# Patient Record
Sex: FEMALE | Race: WHITE | NOT HISPANIC OR LATINO | Employment: OTHER | URBAN - METROPOLITAN AREA
[De-identification: names, ages, dates, MRNs, and addresses within clinical notes are randomized per-mention and may not be internally consistent; named-entity substitution may affect disease eponyms.]

---

## 2023-05-17 PROBLEM — I10 ESSENTIAL HYPERTENSION: Status: ACTIVE | Noted: 2023-05-17

## 2023-05-17 PROBLEM — E78.5 BORDERLINE HYPERLIPIDEMIA: Status: ACTIVE | Noted: 2023-05-17

## 2023-05-17 PROBLEM — M17.9 KNEE OSTEOARTHRITIS: Status: ACTIVE | Noted: 2023-05-17

## 2023-05-17 PROBLEM — E03.9 HYPOTHYROIDISM: Status: ACTIVE | Noted: 2023-05-17

## 2023-05-17 PROBLEM — L25.9 CONTACT DERMATITIS: Status: ACTIVE | Noted: 2023-05-17

## 2023-05-17 RX ORDER — LISINOPRIL 10 MG/1
1 TABLET ORAL DAILY
COMMUNITY
Start: 2012-11-15 | End: 2023-08-02 | Stop reason: SDUPTHER

## 2023-05-17 RX ORDER — LEVOTHYROXINE SODIUM 100 UG/1
1 TABLET ORAL DAILY
COMMUNITY
Start: 2012-11-15 | End: 2024-04-23 | Stop reason: SDUPTHER

## 2023-05-18 ENCOUNTER — OFFICE VISIT (OUTPATIENT)
Dept: PRIMARY CARE | Facility: CLINIC | Age: 73
End: 2023-05-18
Payer: MEDICARE

## 2023-05-18 VITALS
DIASTOLIC BLOOD PRESSURE: 84 MMHG | HEIGHT: 65 IN | RESPIRATION RATE: 16 BRPM | HEART RATE: 86 BPM | SYSTOLIC BLOOD PRESSURE: 146 MMHG | TEMPERATURE: 96.7 F | OXYGEN SATURATION: 97 % | BODY MASS INDEX: 21.3 KG/M2

## 2023-05-18 DIAGNOSIS — M75.81 RIGHT ROTATOR CUFF TENDONITIS: ICD-10-CM

## 2023-05-18 DIAGNOSIS — Z12.31 ENCOUNTER FOR SCREENING MAMMOGRAM FOR BREAST CANCER: ICD-10-CM

## 2023-05-18 DIAGNOSIS — Z00.00 ROUTINE GENERAL MEDICAL EXAMINATION AT HEALTH CARE FACILITY: Primary | ICD-10-CM

## 2023-05-18 DIAGNOSIS — E78.5 BORDERLINE HYPERLIPIDEMIA: ICD-10-CM

## 2023-05-18 DIAGNOSIS — I10 ESSENTIAL HYPERTENSION: ICD-10-CM

## 2023-05-18 PROCEDURE — 99397 PER PM REEVAL EST PAT 65+ YR: CPT | Performed by: INTERNAL MEDICINE

## 2023-05-18 PROCEDURE — 93000 ELECTROCARDIOGRAM COMPLETE: CPT | Performed by: INTERNAL MEDICINE

## 2023-05-18 PROCEDURE — G0444 DEPRESSION SCREEN ANNUAL: HCPCS | Performed by: INTERNAL MEDICINE

## 2023-05-18 PROCEDURE — G0439 PPPS, SUBSEQ VISIT: HCPCS | Performed by: INTERNAL MEDICINE

## 2023-05-18 PROCEDURE — 3077F SYST BP >= 140 MM HG: CPT | Performed by: INTERNAL MEDICINE

## 2023-05-18 PROCEDURE — 3079F DIAST BP 80-89 MM HG: CPT | Performed by: INTERNAL MEDICINE

## 2023-05-18 PROCEDURE — 1159F MED LIST DOCD IN RCRD: CPT | Performed by: INTERNAL MEDICINE

## 2023-05-18 PROCEDURE — 1036F TOBACCO NON-USER: CPT | Performed by: INTERNAL MEDICINE

## 2023-05-18 PROCEDURE — 1170F FXNL STATUS ASSESSED: CPT | Performed by: INTERNAL MEDICINE

## 2023-05-18 RX ORDER — MECLIZINE HYDROCHLORIDE 25 MG/1
25 TABLET ORAL 3 TIMES DAILY
COMMUNITY
Start: 2022-07-30

## 2023-05-18 ASSESSMENT — ACTIVITIES OF DAILY LIVING (ADL)
MANAGING_FINANCES: INDEPENDENT
DRESSING: INDEPENDENT
TAKING_MEDICATION: INDEPENDENT
DOING_HOUSEWORK: INDEPENDENT
BATHING: INDEPENDENT
GROCERY_SHOPPING: INDEPENDENT

## 2023-05-18 ASSESSMENT — PATIENT HEALTH QUESTIONNAIRE - PHQ9
SUM OF ALL RESPONSES TO PHQ9 QUESTIONS 1 AND 2: 0
1. LITTLE INTEREST OR PLEASURE IN DOING THINGS: NOT AT ALL
2. FEELING DOWN, DEPRESSED OR HOPELESS: NOT AT ALL

## 2023-05-19 LAB
ALANINE AMINOTRANSFERASE (SGPT) (U/L) IN SER/PLAS: 12 U/L (ref 7–45)
ALBUMIN (G/DL) IN SER/PLAS: 4.3 G/DL (ref 3.4–5)
ALKALINE PHOSPHATASE (U/L) IN SER/PLAS: 90 U/L (ref 33–136)
ANION GAP IN SER/PLAS: 12 MMOL/L (ref 10–20)
ASPARTATE AMINOTRANSFERASE (SGOT) (U/L) IN SER/PLAS: 18 U/L (ref 9–39)
BILIRUBIN TOTAL (MG/DL) IN SER/PLAS: 0.4 MG/DL (ref 0–1.2)
CALCIUM (MG/DL) IN SER/PLAS: 9.8 MG/DL (ref 8.6–10.3)
CARBON DIOXIDE, TOTAL (MMOL/L) IN SER/PLAS: 26 MMOL/L (ref 21–32)
CHLORIDE (MMOL/L) IN SER/PLAS: 105 MMOL/L (ref 98–107)
CHOLESTEROL (MG/DL) IN SER/PLAS: 205 MG/DL (ref 0–199)
CHOLESTEROL IN HDL (MG/DL) IN SER/PLAS: 69.2 MG/DL
CHOLESTEROL/HDL RATIO: 3
CREATININE (MG/DL) IN SER/PLAS: 0.69 MG/DL (ref 0.5–1.05)
ERYTHROCYTE DISTRIBUTION WIDTH (RATIO) BY AUTOMATED COUNT: 14 % (ref 11.5–14.5)
ERYTHROCYTE MEAN CORPUSCULAR HEMOGLOBIN CONCENTRATION (G/DL) BY AUTOMATED: 33 G/DL (ref 32–36)
ERYTHROCYTE MEAN CORPUSCULAR VOLUME (FL) BY AUTOMATED COUNT: 94 FL (ref 80–100)
ERYTHROCYTES (10*6/UL) IN BLOOD BY AUTOMATED COUNT: 4.59 X10E12/L (ref 4–5.2)
GFR FEMALE: >90 ML/MIN/1.73M2
GLUCOSE (MG/DL) IN SER/PLAS: 86 MG/DL (ref 74–99)
HEMATOCRIT (%) IN BLOOD BY AUTOMATED COUNT: 43.3 % (ref 36–46)
HEMOGLOBIN (G/DL) IN BLOOD: 14.3 G/DL (ref 12–16)
LDL: 107 MG/DL (ref 0–99)
LEUKOCYTES (10*3/UL) IN BLOOD BY AUTOMATED COUNT: 5.7 X10E9/L (ref 4.4–11.3)
PLATELETS (10*3/UL) IN BLOOD AUTOMATED COUNT: 315 X10E9/L (ref 150–450)
POTASSIUM (MMOL/L) IN SER/PLAS: 4.6 MMOL/L (ref 3.5–5.3)
PROTEIN TOTAL: 7.5 G/DL (ref 6.4–8.2)
SODIUM (MMOL/L) IN SER/PLAS: 138 MMOL/L (ref 136–145)
THYROTROPIN (MIU/L) IN SER/PLAS BY DETECTION LIMIT <= 0.05 MIU/L: 0.69 MIU/L (ref 0.44–3.98)
TRIGLYCERIDE (MG/DL) IN SER/PLAS: 142 MG/DL (ref 0–149)
UREA NITROGEN (MG/DL) IN SER/PLAS: 18 MG/DL (ref 6–23)
VLDL: 28 MG/DL (ref 0–40)

## 2023-05-19 ASSESSMENT — ENCOUNTER SYMPTOMS
DIFFICULTY URINATING: 0
UNEXPECTED WEIGHT CHANGE: 0
ACTIVITY CHANGE: 0
LIGHT-HEADEDNESS: 0
HEMATURIA: 0
VOMITING: 0
VOICE CHANGE: 0
NAUSEA: 0
CONSTIPATION: 0
PALPITATIONS: 0
TREMORS: 0
TROUBLE SWALLOWING: 0
DIARRHEA: 0
WHEEZING: 0
ARTHRALGIAS: 0
FATIGUE: 0
DIZZINESS: 0
BLOOD IN STOOL: 0
APPETITE CHANGE: 0
HEADACHES: 0
COUGH: 0
CHEST TIGHTNESS: 0

## 2023-05-19 NOTE — PROGRESS NOTES
"Subjective   Reason for Visit: Evelyne Coley is an 72 y.o. female here for a Medicare Wellness visit.     Past Medical, Surgical, and Family History reviewed and updated in chart.         74 yo female here for a AMWV  Mother passed away in March after a prolonged hospital/rehab stay    Ongoing right shoulder pain - now has time for PT        Patient Care Team:  Candice Johnston MD as PCP - General  Candice Johnston MD as PCP - [employer] Medicare Advantage PCP  Lucy Schwartz MD as Referring Physician (Rheumatology)  Theresa John MD as Referring Physician (Ophthalmology)  Mansoor Bradley MD as Referring Physician (Sports Medicine)     Review of Systems   Constitutional:  Negative for activity change, appetite change, fatigue and unexpected weight change.   HENT:  Negative for ear pain, hearing loss, tinnitus, trouble swallowing and voice change.    Eyes:  Negative for visual disturbance.   Respiratory:  Negative for cough, chest tightness and wheezing.    Cardiovascular:  Negative for chest pain, palpitations and leg swelling.   Gastrointestinal:  Negative for blood in stool, constipation, diarrhea, nausea and vomiting.   Genitourinary:  Negative for difficulty urinating, hematuria and vaginal bleeding.   Musculoskeletal:  Negative for arthralgias.   Skin:  Negative for rash.   Neurological:  Negative for dizziness, tremors, syncope, light-headedness and headaches.       Objective   Vitals:  /84   Pulse 86   Temp 35.9 °C (96.7 °F)   Resp 16   Ht 1.651 m (5' 5\")   SpO2 97%   BMI 21.30 kg/m²       Physical Exam  Constitutional:       General: She is not in acute distress.     Appearance: She is well-developed. She is not diaphoretic.   HENT:      Head: Normocephalic.      Right Ear: Tympanic membrane normal. There is no impacted cerumen.      Left Ear: Tympanic membrane normal. There is no impacted cerumen.      Nose: Nose normal.      Mouth/Throat:      Mouth: Mucous membranes are moist.    "   Pharynx: Oropharynx is clear. No oropharyngeal exudate or posterior oropharyngeal erythema.   Eyes:      General: No scleral icterus.     Extraocular Movements: Extraocular movements intact.      Conjunctiva/sclera: Conjunctivae normal.      Pupils: Pupils are equal, round, and reactive to light.   Neck:      Thyroid: No thyromegaly.      Vascular: No JVD.   Cardiovascular:      Rate and Rhythm: Normal rate and regular rhythm.      Pulses: Normal pulses.      Heart sounds: Normal heart sounds. No murmur heard.     No friction rub. No gallop.   Pulmonary:      Effort: Pulmonary effort is normal. No respiratory distress.      Breath sounds: Normal breath sounds. No wheezing or rales.   Chest:      Chest wall: No tenderness.   Breasts:     Right: Normal.      Left: Normal.   Abdominal:      General: Bowel sounds are normal. There is no distension.      Palpations: Abdomen is soft. There is no mass.      Tenderness: There is no abdominal tenderness. There is no rebound.   Musculoskeletal:         General: Normal range of motion.      Cervical back: Normal range of motion and neck supple.   Lymphadenopathy:      Cervical: No cervical adenopathy.   Skin:     General: Skin is warm and dry.   Neurological:      General: No focal deficit present.      Mental Status: She is alert and oriented to person, place, and time.      Deep Tendon Reflexes: Reflexes normal.   Psychiatric:         Mood and Affect: Mood normal.         Thought Content: Thought content normal.         Assessment/Plan   Problem List Items Addressed This Visit       Borderline hyperlipidemia    Relevant Orders    ECG 12 lead (Completed)    Essential hypertension    Current Assessment & Plan     A little high   Will check her BP tiwce a week for 4 weeks and send in the readings            Other Visit Diagnoses       Routine general medical examination at health care facility    -  Primary    Right rotator cuff tendonitis        Relevant Orders    Referral  to Physical Therapy    Encounter for screening mammogram for breast cancer        Relevant Orders    BI mammo bilateral screening tomosynthesis

## 2023-06-30 LAB — CALCIDIOL (25 OH VITAMIN D3) (NG/ML) IN SER/PLAS: 17 NG/ML

## 2023-08-02 DIAGNOSIS — I10 ESSENTIAL HYPERTENSION: ICD-10-CM

## 2023-08-02 RX ORDER — LISINOPRIL 10 MG/1
10 TABLET ORAL DAILY
Qty: 90 TABLET | Refills: 1 | Status: SHIPPED | OUTPATIENT
Start: 2023-08-02 | End: 2024-01-26 | Stop reason: SDUPTHER

## 2023-10-09 ENCOUNTER — CLINICAL SUPPORT (OUTPATIENT)
Dept: PRIMARY CARE | Facility: CLINIC | Age: 73
End: 2023-10-09
Payer: MEDICARE

## 2023-10-09 DIAGNOSIS — Z23 ENCOUNTER FOR IMMUNIZATION: Primary | ICD-10-CM

## 2023-10-18 PROCEDURE — G0008 ADMIN INFLUENZA VIRUS VAC: HCPCS | Performed by: INTERNAL MEDICINE

## 2023-10-18 PROCEDURE — 90662 IIV NO PRSV INCREASED AG IM: CPT | Performed by: INTERNAL MEDICINE

## 2023-11-20 ENCOUNTER — OFFICE VISIT (OUTPATIENT)
Dept: PRIMARY CARE | Facility: CLINIC | Age: 73
End: 2023-11-20
Payer: MEDICARE

## 2023-11-20 ENCOUNTER — ANCILLARY PROCEDURE (OUTPATIENT)
Dept: RADIOLOGY | Facility: CLINIC | Age: 73
End: 2023-11-20
Payer: MEDICARE

## 2023-11-20 VITALS
TEMPERATURE: 96.1 F | DIASTOLIC BLOOD PRESSURE: 84 MMHG | HEIGHT: 65 IN | HEART RATE: 90 BPM | SYSTOLIC BLOOD PRESSURE: 134 MMHG | OXYGEN SATURATION: 96 % | BODY MASS INDEX: 25.83 KG/M2 | WEIGHT: 155 LBS

## 2023-11-20 DIAGNOSIS — M25.532 PAIN IN BOTH WRISTS: Primary | ICD-10-CM

## 2023-11-20 DIAGNOSIS — M25.532 PAIN IN BOTH WRISTS: ICD-10-CM

## 2023-11-20 DIAGNOSIS — E03.9 HYPOTHYROIDISM, UNSPECIFIED TYPE: ICD-10-CM

## 2023-11-20 DIAGNOSIS — M25.531 PAIN IN BOTH WRISTS: ICD-10-CM

## 2023-11-20 DIAGNOSIS — I10 ESSENTIAL HYPERTENSION: ICD-10-CM

## 2023-11-20 DIAGNOSIS — M25.531 PAIN IN BOTH WRISTS: Primary | ICD-10-CM

## 2023-11-20 PROCEDURE — 99214 OFFICE O/P EST MOD 30 MIN: CPT | Performed by: INTERNAL MEDICINE

## 2023-11-20 PROCEDURE — 3079F DIAST BP 80-89 MM HG: CPT | Performed by: INTERNAL MEDICINE

## 2023-11-20 PROCEDURE — 1036F TOBACCO NON-USER: CPT | Performed by: INTERNAL MEDICINE

## 2023-11-20 PROCEDURE — 73110 X-RAY EXAM OF WRIST: CPT | Mod: BILATERAL PROCEDURE | Performed by: RADIOLOGY

## 2023-11-20 PROCEDURE — 73110 X-RAY EXAM OF WRIST: CPT | Mod: 50

## 2023-11-20 PROCEDURE — 1159F MED LIST DOCD IN RCRD: CPT | Performed by: INTERNAL MEDICINE

## 2023-11-20 PROCEDURE — 3075F SYST BP GE 130 - 139MM HG: CPT | Performed by: INTERNAL MEDICINE

## 2023-11-20 RX ORDER — MELOXICAM 15 MG/1
15 TABLET ORAL DAILY
Qty: 30 TABLET | Refills: 11 | Status: SHIPPED | OUTPATIENT
Start: 2023-11-20 | End: 2024-11-19

## 2023-11-20 NOTE — PROGRESS NOTES
"Subjective   Patient ID: Evelyne Coley is a 72 y.o. female who presents for 6 month follow up and Hypertension.    HPI   Patient presents today following up with HTN. Patient is currently taking Lisinopril with out any complications. Patient does not check her blood pressure at home on a regular basis. Patient denies SOB, headaches, dizziness and chest pains.     C/o hands hurting - with activity  No painovernight  No pain in am  No stiffness  Hurts to grasp  Benggay does not help  Has tried tylenol and Aleve - the aleve helps      Review of Systems    Objective   /84 (BP Location: Right arm, Patient Position: Sitting)   Pulse 90   Temp 35.6 °C (96.1 °F)   Ht 1.651 m (5' 5\")   Wt 70.3 kg (155 lb)   SpO2 96%   BMI 25.79 kg/m²     Physical Exam  Vitals and nursing note reviewed.   Cardiovascular:      Rate and Rhythm: Normal rate and regular rhythm.   Pulmonary:      Effort: Pulmonary effort is normal.      Breath sounds: Normal breath sounds.         Assessment/Plan   Problem List Items Addressed This Visit             ICD-10-CM    Essential hypertension I10     Stable         Hypothyroidism E03.9     Other Visit Diagnoses         Codes    Pain in both wrists    -  Primary M25.531, M25.532    Meloxicam for 30 days - if no better to ortho     Relevant Medications    meloxicam (Mobic) 15 mg tablet    Other Relevant Orders    XR wrist 3+ views bilateral          Follow up with me in 6 months     "

## 2024-01-26 DIAGNOSIS — I10 ESSENTIAL HYPERTENSION: ICD-10-CM

## 2024-01-26 RX ORDER — LISINOPRIL 10 MG/1
10 TABLET ORAL DAILY
Qty: 90 TABLET | Refills: 1 | Status: SHIPPED | OUTPATIENT
Start: 2024-01-26 | End: 2024-05-03 | Stop reason: SDUPTHER

## 2024-04-23 DIAGNOSIS — E03.9 HYPOTHYROIDISM, UNSPECIFIED TYPE: Primary | ICD-10-CM

## 2024-04-23 DIAGNOSIS — E03.9 HYPOTHYROIDISM, UNSPECIFIED TYPE: ICD-10-CM

## 2024-04-23 RX ORDER — LEVOTHYROXINE SODIUM 100 UG/1
100 TABLET ORAL DAILY
Qty: 90 TABLET | Refills: 0 | Status: SHIPPED | OUTPATIENT
Start: 2024-04-23 | End: 2024-04-23 | Stop reason: SDUPTHER

## 2024-04-23 RX ORDER — LEVOTHYROXINE SODIUM 100 UG/1
100 TABLET ORAL DAILY
Qty: 90 TABLET | Refills: 0 | Status: SHIPPED | OUTPATIENT
Start: 2024-04-23

## 2024-04-23 NOTE — TELEPHONE ENCOUNTER
Pt called stated she called for refill of rx yesterday. Nothing has been called in.  Pt only has 2 pills of  levothyroxine.  Please send rx to local [facility] in [place] (pt not using mail in express scripts anymore)    Please call pt at  [phone]

## 2024-05-03 DIAGNOSIS — I10 ESSENTIAL HYPERTENSION: ICD-10-CM

## 2024-05-03 RX ORDER — LISINOPRIL 10 MG/1
10 TABLET ORAL DAILY
Qty: 90 TABLET | Refills: 1 | Status: SHIPPED | OUTPATIENT
Start: 2024-05-03 | End: 2024-05-23

## 2024-05-23 ENCOUNTER — OFFICE VISIT (OUTPATIENT)
Dept: PRIMARY CARE | Facility: CLINIC | Age: 74
End: 2024-05-23
Payer: MEDICARE

## 2024-05-23 VITALS
HEART RATE: 93 BPM | BODY MASS INDEX: 26.33 KG/M2 | WEIGHT: 158 LBS | SYSTOLIC BLOOD PRESSURE: 158 MMHG | HEIGHT: 65 IN | DIASTOLIC BLOOD PRESSURE: 94 MMHG | OXYGEN SATURATION: 97 % | TEMPERATURE: 97.3 F

## 2024-05-23 DIAGNOSIS — Z00.00 ROUTINE GENERAL MEDICAL EXAMINATION AT A HEALTH CARE FACILITY: ICD-10-CM

## 2024-05-23 DIAGNOSIS — Z11.59 NEED FOR HEPATITIS C SCREENING TEST: ICD-10-CM

## 2024-05-23 DIAGNOSIS — I10 ESSENTIAL HYPERTENSION: ICD-10-CM

## 2024-05-23 DIAGNOSIS — Z00.00 ROUTINE GENERAL MEDICAL EXAMINATION AT HEALTH CARE FACILITY: Primary | ICD-10-CM

## 2024-05-23 DIAGNOSIS — Z13.6 SCREENING FOR CARDIOVASCULAR CONDITION: ICD-10-CM

## 2024-05-23 DIAGNOSIS — Z12.31 ENCOUNTER FOR SCREENING MAMMOGRAM FOR BREAST CANCER: ICD-10-CM

## 2024-05-23 PROCEDURE — 1159F MED LIST DOCD IN RCRD: CPT | Performed by: INTERNAL MEDICINE

## 2024-05-23 PROCEDURE — 1157F ADVNC CARE PLAN IN RCRD: CPT | Performed by: INTERNAL MEDICINE

## 2024-05-23 PROCEDURE — 3080F DIAST BP >= 90 MM HG: CPT | Performed by: INTERNAL MEDICINE

## 2024-05-23 PROCEDURE — G0444 DEPRESSION SCREEN ANNUAL: HCPCS | Performed by: INTERNAL MEDICINE

## 2024-05-23 PROCEDURE — 3077F SYST BP >= 140 MM HG: CPT | Performed by: INTERNAL MEDICINE

## 2024-05-23 PROCEDURE — 1170F FXNL STATUS ASSESSED: CPT | Performed by: INTERNAL MEDICINE

## 2024-05-23 PROCEDURE — 93000 ELECTROCARDIOGRAM COMPLETE: CPT | Performed by: INTERNAL MEDICINE

## 2024-05-23 PROCEDURE — 99397 PER PM REEVAL EST PAT 65+ YR: CPT | Performed by: INTERNAL MEDICINE

## 2024-05-23 PROCEDURE — G0439 PPPS, SUBSEQ VISIT: HCPCS | Performed by: INTERNAL MEDICINE

## 2024-05-23 PROCEDURE — 1160F RVW MEDS BY RX/DR IN RCRD: CPT | Performed by: INTERNAL MEDICINE

## 2024-05-23 PROCEDURE — 1036F TOBACCO NON-USER: CPT | Performed by: INTERNAL MEDICINE

## 2024-05-23 RX ORDER — LISINOPRIL 20 MG/1
20 TABLET ORAL DAILY
Qty: 100 TABLET | Refills: 3 | Status: SHIPPED | OUTPATIENT
Start: 2024-05-23 | End: 2025-06-27

## 2024-05-23 ASSESSMENT — ENCOUNTER SYMPTOMS
NAUSEA: 0
APPETITE CHANGE: 0
VOMITING: 0
DIZZINESS: 0
DEPRESSION: 0
LOSS OF SENSATION IN FEET: 0
OCCASIONAL FEELINGS OF UNSTEADINESS: 0
HEMATURIA: 0
FATIGUE: 0
COUGH: 0
UNEXPECTED WEIGHT CHANGE: 0
DIARRHEA: 0
TROUBLE SWALLOWING: 0
VOICE CHANGE: 0
DIFFICULTY URINATING: 0
HEADACHES: 0
BLOOD IN STOOL: 0
WHEEZING: 0
ACTIVITY CHANGE: 0
CHEST TIGHTNESS: 0
PALPITATIONS: 0
LIGHT-HEADEDNESS: 0
TREMORS: 0
ARTHRALGIAS: 0
CONSTIPATION: 0

## 2024-05-23 ASSESSMENT — PATIENT HEALTH QUESTIONNAIRE - PHQ9
2. FEELING DOWN, DEPRESSED OR HOPELESS: NOT AT ALL
SUM OF ALL RESPONSES TO PHQ9 QUESTIONS 1 AND 2: 0
1. LITTLE INTEREST OR PLEASURE IN DOING THINGS: NOT AT ALL

## 2024-05-23 ASSESSMENT — ACTIVITIES OF DAILY LIVING (ADL)
MANAGING_FINANCES: INDEPENDENT
BATHING: INDEPENDENT
DRESSING: INDEPENDENT
TAKING_MEDICATION: INDEPENDENT
GROCERY_SHOPPING: INDEPENDENT
DOING_HOUSEWORK: INDEPENDENT

## 2024-05-23 NOTE — PROGRESS NOTES
"Subjective   Reason for Visit: Evelyne Coley is an 73 y.o. female here for a Medicare Wellness visit.     Past Medical, Surgical, and Family History reviewed and updated in chart.    Reviewed all medications by prescribing practitioner or clinical pharmacist (such as prescriptions, OTCs, herbal therapies and supplements) and documented in the medical record.    73 y.o. here for a AMWV/physical  Has been feeling well  No active complaints today      Retired -  Children - 3  Grandchildren -    Tob - Nonsmoker  Alcohol - rare  Marijuana  - denies  Exercise -  rare          Patient Care Team:  Candice Johnston MD as PCP - General  Candice Johnston MD as PCP - tna Medicare Advantage PCP  Lucy Schwartz MD as Referring Physician (Rheumatology)  Theresa John MD as Referring Physician (Ophthalmology)  Mansoor Bradley MD as Referring Physician (Sports Medicine)     Review of Systems   Constitutional:  Negative for activity change, appetite change, fatigue and unexpected weight change.   HENT:  Negative for ear pain, hearing loss, tinnitus, trouble swallowing and voice change.    Eyes:  Negative for visual disturbance.   Respiratory:  Negative for cough, chest tightness and wheezing.    Cardiovascular:  Negative for chest pain, palpitations and leg swelling.   Gastrointestinal:  Negative for blood in stool, constipation, diarrhea, nausea and vomiting.   Genitourinary:  Negative for difficulty urinating, hematuria and vaginal bleeding.   Musculoskeletal:  Negative for arthralgias.   Skin:  Negative for rash.   Neurological:  Negative for dizziness, tremors, syncope, light-headedness and headaches.       Objective   Vitals:  BP (!) 158/94   Pulse 93   Temp 36.3 °C (97.3 °F)   Ht 1.651 m (5' 5\")   Wt 71.7 kg (158 lb)   SpO2 97%   BMI 26.29 kg/m²       Physical Exam  Constitutional:       General: She is not in acute distress.     Appearance: She is well-developed. She is not diaphoretic. "   HENT:      Head: Normocephalic.      Right Ear: Tympanic membrane normal. There is no impacted cerumen.      Left Ear: Tympanic membrane normal. There is no impacted cerumen.      Nose: Nose normal.      Mouth/Throat:      Mouth: Mucous membranes are moist.      Pharynx: Oropharynx is clear. No oropharyngeal exudate or posterior oropharyngeal erythema.   Eyes:      General: No scleral icterus.     Extraocular Movements: Extraocular movements intact.      Conjunctiva/sclera: Conjunctivae normal.      Pupils: Pupils are equal, round, and reactive to light.   Neck:      Thyroid: No thyromegaly.      Vascular: No JVD.   Cardiovascular:      Rate and Rhythm: Normal rate and regular rhythm.      Pulses: Normal pulses.      Heart sounds: Normal heart sounds. No murmur heard.     No friction rub. No gallop.   Pulmonary:      Effort: Pulmonary effort is normal. No respiratory distress.      Breath sounds: Normal breath sounds. No wheezing or rales.   Chest:      Chest wall: No tenderness.   Abdominal:      General: Bowel sounds are normal. There is no distension.      Palpations: Abdomen is soft. There is no mass.      Tenderness: There is no abdominal tenderness. There is no rebound.   Musculoskeletal:         General: Normal range of motion.      Cervical back: Normal range of motion and neck supple.   Lymphadenopathy:      Cervical: No cervical adenopathy.   Skin:     General: Skin is warm and dry.   Neurological:      General: No focal deficit present.      Mental Status: She is alert and oriented to person, place, and time.      Deep Tendon Reflexes: Reflexes normal.   Psychiatric:         Mood and Affect: Mood normal.         Thought Content: Thought content normal.         Assessment/Plan   Problem List Items Addressed This Visit       Essential hypertension    Relevant Medications    lisinopril 20 mg tablet    Other Relevant Orders    ECG 12 lead (Clinic Performed)    CBC     Other Visit Diagnoses       Routine  general medical examination at health care facility    -  Primary    Relevant Orders    1 Year Follow Up In [facility] - PCP - Wellness Exam    Routine general medical examination at a health care facility        Relevant Orders    CBC    Comprehensive Metabolic Panel    Lipid Panel    TSH with reflex to Free T4 if abnormal    Screening for cardiovascular condition        Relevant Orders    CT cardiac scoring wo IV contrast    Encounter for screening mammogram for breast cancer        Relevant Orders    BI mammo bilateral screening tomosynthesis    Need for hepatitis C screening test        Relevant Orders    Hepatitis C antibody          TdaP recommended  Depression screening completed via PHQ2 as documented (time spent 5 min)  Follow up with me in 4 months

## 2024-05-29 ENCOUNTER — LAB (OUTPATIENT)
Dept: LAB | Facility: LAB | Age: 74
End: 2024-05-29
Payer: MEDICARE

## 2024-05-29 DIAGNOSIS — Z00.00 ROUTINE GENERAL MEDICAL EXAMINATION AT A HEALTH CARE FACILITY: ICD-10-CM

## 2024-05-29 DIAGNOSIS — I10 ESSENTIAL HYPERTENSION: ICD-10-CM

## 2024-05-29 DIAGNOSIS — Z11.59 NEED FOR HEPATITIS C SCREENING TEST: ICD-10-CM

## 2024-05-29 LAB
ALBUMIN SERPL BCP-MCNC: 4.3 G/DL (ref 3.4–5)
ALP SERPL-CCNC: 87 U/L (ref 33–136)
ALT SERPL W P-5'-P-CCNC: 13 U/L (ref 7–45)
ANION GAP SERPL CALC-SCNC: 15 MMOL/L (ref 10–20)
AST SERPL W P-5'-P-CCNC: 14 U/L (ref 9–39)
BILIRUB SERPL-MCNC: 0.4 MG/DL (ref 0–1.2)
BUN SERPL-MCNC: 19 MG/DL (ref 6–23)
CALCIUM SERPL-MCNC: 9.6 MG/DL (ref 8.6–10.6)
CHLORIDE SERPL-SCNC: 105 MMOL/L (ref 98–107)
CHOLEST SERPL-MCNC: 222 MG/DL (ref 0–199)
CHOLESTEROL/HDL RATIO: 4.3
CO2 SERPL-SCNC: 23 MMOL/L (ref 21–32)
CREAT SERPL-MCNC: 0.81 MG/DL (ref 0.5–1.05)
EGFRCR SERPLBLD CKD-EPI 2021: 77 ML/MIN/1.73M*2
ERYTHROCYTE [DISTWIDTH] IN BLOOD BY AUTOMATED COUNT: 14.3 % (ref 11.5–14.5)
GLUCOSE SERPL-MCNC: 91 MG/DL (ref 74–99)
HCT VFR BLD AUTO: 43.6 % (ref 36–46)
HCV AB SER QL: NONREACTIVE
HDLC SERPL-MCNC: 51.7 MG/DL
HGB BLD-MCNC: 14.3 G/DL (ref 12–16)
LDLC SERPL CALC-MCNC: 132 MG/DL
MCH RBC QN AUTO: 30.6 PG (ref 26–34)
MCHC RBC AUTO-ENTMCNC: 32.8 G/DL (ref 32–36)
MCV RBC AUTO: 93 FL (ref 80–100)
NON HDL CHOLESTEROL: 170 MG/DL (ref 0–149)
NRBC BLD-RTO: 0 /100 WBCS (ref 0–0)
PLATELET # BLD AUTO: 319 X10*3/UL (ref 150–450)
POTASSIUM SERPL-SCNC: 4.4 MMOL/L (ref 3.5–5.3)
PROT SERPL-MCNC: 7.2 G/DL (ref 6.4–8.2)
RBC # BLD AUTO: 4.68 X10*6/UL (ref 4–5.2)
SODIUM SERPL-SCNC: 139 MMOL/L (ref 136–145)
TRIGL SERPL-MCNC: 191 MG/DL (ref 0–149)
TSH SERPL-ACNC: 0.79 MIU/L (ref 0.44–3.98)
VLDL: 38 MG/DL (ref 0–40)
WBC # BLD AUTO: 5.4 X10*3/UL (ref 4.4–11.3)

## 2024-05-29 PROCEDURE — 36415 COLL VENOUS BLD VENIPUNCTURE: CPT

## 2024-05-29 PROCEDURE — 80061 LIPID PANEL: CPT

## 2024-05-29 PROCEDURE — 84443 ASSAY THYROID STIM HORMONE: CPT

## 2024-05-29 PROCEDURE — 85027 COMPLETE CBC AUTOMATED: CPT

## 2024-05-29 PROCEDURE — 80053 COMPREHEN METABOLIC PANEL: CPT

## 2024-05-29 PROCEDURE — 86803 HEPATITIS C AB TEST: CPT

## 2024-06-18 ENCOUNTER — TELEPHONE (OUTPATIENT)
Dept: PRIMARY CARE | Facility: CLINIC | Age: 74
End: 2024-06-18
Payer: MEDICARE

## 2024-06-19 ENCOUNTER — OFFICE VISIT (OUTPATIENT)
Dept: PRIMARY CARE | Facility: CLINIC | Age: 74
End: 2024-06-19
Payer: MEDICARE

## 2024-06-19 ENCOUNTER — APPOINTMENT (OUTPATIENT)
Dept: RADIOLOGY | Facility: CLINIC | Age: 74
End: 2024-06-19
Payer: MEDICARE

## 2024-06-19 VITALS
WEIGHT: 158 LBS | HEART RATE: 98 BPM | SYSTOLIC BLOOD PRESSURE: 131 MMHG | OXYGEN SATURATION: 95 % | DIASTOLIC BLOOD PRESSURE: 83 MMHG | BODY MASS INDEX: 26.33 KG/M2 | HEIGHT: 65 IN | TEMPERATURE: 97 F

## 2024-06-19 DIAGNOSIS — J06.9 VIRAL UPPER RESPIRATORY TRACT INFECTION: Primary | ICD-10-CM

## 2024-06-19 PROCEDURE — 1157F ADVNC CARE PLAN IN RCRD: CPT | Performed by: INTERNAL MEDICINE

## 2024-06-19 PROCEDURE — 3075F SYST BP GE 130 - 139MM HG: CPT | Performed by: INTERNAL MEDICINE

## 2024-06-19 PROCEDURE — 99213 OFFICE O/P EST LOW 20 MIN: CPT | Performed by: INTERNAL MEDICINE

## 2024-06-19 PROCEDURE — 3079F DIAST BP 80-89 MM HG: CPT | Performed by: INTERNAL MEDICINE

## 2024-06-19 PROCEDURE — 1036F TOBACCO NON-USER: CPT | Performed by: INTERNAL MEDICINE

## 2024-06-19 PROCEDURE — 1159F MED LIST DOCD IN RCRD: CPT | Performed by: INTERNAL MEDICINE

## 2024-06-19 PROCEDURE — G2211 COMPLEX E/M VISIT ADD ON: HCPCS | Performed by: INTERNAL MEDICINE

## 2024-06-19 ASSESSMENT — ENCOUNTER SYMPTOMS
SINUS PRESSURE: 0
ACTIVITY CHANGE: 1
COUGH: 1
RHINORRHEA: 1
SINUS PAIN: 0

## 2024-06-19 NOTE — PROGRESS NOTES
Subjective   Patient ID: Evelyne Coley is a 73 y.o. female who presents for Cough and Follow-up (Neg Covid test. ).  Here with c/o 3 days of a URI  Started with a cough  Now has a runny nose  PND  No fevers/chills  No nausea  Some chest congestion but no nasal congestion    Takes Coricidin HBP with good improvement        Review of Systems   Constitutional:  Positive for activity change.   HENT:  Positive for postnasal drip and rhinorrhea. Negative for congestion, ear pain, sinus pressure and sinus pain.    Respiratory:  Positive for cough.        Objective   Vitals:    06/19/24 0855   BP: 131/83   Pulse: 98   Temp: 36.1 °C (97 °F)   SpO2: 95%     Physical Exam    Assessment/Plan   Problem List Items Addressed This Visit       Viral upper respiratory tract infection - Primary    Current Assessment & Plan     Rec supportive care  Call back in 1 week if no better

## 2024-07-05 ENCOUNTER — HOSPITAL ENCOUNTER (OUTPATIENT)
Dept: RADIOLOGY | Facility: CLINIC | Age: 74
Discharge: HOME | End: 2024-07-05
Payer: MEDICARE

## 2024-07-05 VITALS — BODY MASS INDEX: 25.33 KG/M2 | HEIGHT: 65 IN | WEIGHT: 152 LBS

## 2024-07-05 DIAGNOSIS — Z12.31 ENCOUNTER FOR SCREENING MAMMOGRAM FOR BREAST CANCER: ICD-10-CM

## 2024-07-05 DIAGNOSIS — Z13.6 SCREENING FOR CARDIOVASCULAR CONDITION: ICD-10-CM

## 2024-07-05 PROCEDURE — 75571 CT HRT W/O DYE W/CA TEST: CPT

## 2024-07-05 PROCEDURE — 77067 SCR MAMMO BI INCL CAD: CPT

## 2024-08-12 ENCOUNTER — LAB (OUTPATIENT)
Dept: LAB | Facility: LAB | Age: 74
End: 2024-08-12
Payer: MEDICARE

## 2024-08-12 ENCOUNTER — APPOINTMENT (OUTPATIENT)
Dept: RHEUMATOLOGY | Facility: CLINIC | Age: 74
End: 2024-08-12
Payer: MEDICARE

## 2024-08-12 VITALS
WEIGHT: 159.6 LBS | TEMPERATURE: 97.7 F | RESPIRATION RATE: 16 BRPM | SYSTOLIC BLOOD PRESSURE: 123 MMHG | HEIGHT: 65 IN | BODY MASS INDEX: 26.59 KG/M2 | HEART RATE: 101 BPM | OXYGEN SATURATION: 95 % | DIASTOLIC BLOOD PRESSURE: 82 MMHG

## 2024-08-12 DIAGNOSIS — M17.0 PRIMARY OSTEOARTHRITIS OF BOTH KNEES: ICD-10-CM

## 2024-08-12 DIAGNOSIS — M81.0 AGE-RELATED OSTEOPOROSIS WITHOUT CURRENT PATHOLOGICAL FRACTURE: ICD-10-CM

## 2024-08-12 DIAGNOSIS — M81.0 OSTEOPOROSIS, UNSPECIFIED OSTEOPOROSIS TYPE, UNSPECIFIED PATHOLOGICAL FRACTURE PRESENCE: ICD-10-CM

## 2024-08-12 DIAGNOSIS — M81.0 OSTEOPOROSIS, UNSPECIFIED OSTEOPOROSIS TYPE, UNSPECIFIED PATHOLOGICAL FRACTURE PRESENCE: Primary | ICD-10-CM

## 2024-08-12 LAB — 25(OH)D3 SERPL-MCNC: 27 NG/ML (ref 30–100)

## 2024-08-12 PROCEDURE — 3008F BODY MASS INDEX DOCD: CPT | Performed by: INTERNAL MEDICINE

## 2024-08-12 PROCEDURE — 1159F MED LIST DOCD IN RCRD: CPT | Performed by: INTERNAL MEDICINE

## 2024-08-12 PROCEDURE — 36415 COLL VENOUS BLD VENIPUNCTURE: CPT

## 2024-08-12 PROCEDURE — 3079F DIAST BP 80-89 MM HG: CPT | Performed by: INTERNAL MEDICINE

## 2024-08-12 PROCEDURE — 1036F TOBACCO NON-USER: CPT | Performed by: INTERNAL MEDICINE

## 2024-08-12 PROCEDURE — 1160F RVW MEDS BY RX/DR IN RCRD: CPT | Performed by: INTERNAL MEDICINE

## 2024-08-12 PROCEDURE — 1157F ADVNC CARE PLAN IN RCRD: CPT | Performed by: INTERNAL MEDICINE

## 2024-08-12 PROCEDURE — 99214 OFFICE O/P EST MOD 30 MIN: CPT | Performed by: INTERNAL MEDICINE

## 2024-08-12 PROCEDURE — 82306 VITAMIN D 25 HYDROXY: CPT

## 2024-08-12 PROCEDURE — 3074F SYST BP LT 130 MM HG: CPT | Performed by: INTERNAL MEDICINE

## 2024-08-12 ASSESSMENT — PATIENT HEALTH QUESTIONNAIRE - PHQ9: 1. LITTLE INTEREST OR PLEASURE IN DOING THINGS: NOT AT ALL

## 2024-08-12 NOTE — PROGRESS NOTES
Subjective   Patient ID: Evelyne Coley is a 73 y.o. female who presents for follow-up regarding osteoporosis.    HPI 73-year-old female here for follow-up regarding osteoporosis.    She was given a prescription for risedronate 150 mg orally once monthly August 2023 for treatment of osteoporosis.  However, she relates that she did not yet started.  She is taking Citracal 500 mg daily and vitamin D 1000 international units daily.  She was advised to see her dentist first and has not yet had a dental appointment.    She relates that she has been very busy with some health issues that her  has had over the last year.  Fortunately her  is doing better at this point, and she is planning on scheduling a dental visit.    She has no history of fractures as an adult.  There is no family history of osteoporosis or hip fracture.  Last menstrual period was in her mid 40s.  She took hormone replacement therapy for about 10 years.    She also notes recurrent bilateral knee pain.  She uses meloxicam 15 mg as needed, but does not take this daily.  She uses Tylenol occasionally.  Her knees were injected with Kenalog about 1 year ago with relief.    Labs June 2023: 25-hydroxy vitamin D 17  Labs May 2024: TSH 0.79, cholesterol 222, HDL 51, , triglycerides 191, CMP normal, CBC normal, hep C nonreactive    DEXA July 2023: T-score -2.7 left femoral neck, T-score -2.8 left total hip, T-score -1.0 lumbar spine    Knee x-rays done December 2021: Moderate joint space narrowing in the medial compartment of the right knee, mild joint space narrowing in the left knee.    Medical problem list:  Essential hypertension  Hypothyroidism  Osteoporosis    Medications: Reviewed as documented    Social history: .  Has 2 daughters.  She has 3 grandchildren.  She denies use of tobacco and alcohol.    Family history:  Mother-hypertension  Father-prostate cancer    Review of Systems  General: Denies fevers or chills.  CV:  "Denies chest pain or palpitations.  Denies leg edema.  Lungs: Denies coughing or shortness of breath.  Skin: Denies rashes or nodules.  MS: Complains of bilateral knee pain.    Objective   There were no vitals taken for this visit.  Visit Vitals  /82 (BP Location: Left arm, Patient Position: Sitting, BP Cuff Size: Adult)   Pulse 101   Temp 36.5 °C (97.7 °F) (Skin)   Resp 16   Ht 1.651 m (5' 5\")   Wt 72.4 kg (159 lb 9.6 oz)   SpO2 95%   BMI 26.56 kg/m²   OB Status Postmenopausal   Smoking Status Never   BSA 1.82 m²      Physical Exam  HEENT: PERRL, EOMI  Neck: Supple, no nodes.  CV: RRR, no MGR.  Lungs: Clear, no rales or wheezes.  Abdomen: Soft, nontender. No hepatosplenomegaly.  Extremities:  No cyanosis, clubbing, or edema.   MS: No synovitis.  Knees cool without effusions.  Moderate crepitus in right knee, mild crepitus in left knee.  Skin: No rashes or nodules.      Assessment/Plan   Problem List Items Addressed This Visit             ICD-10-CM    Knee osteoarthritis M17.9    Age-related osteoporosis without current pathological fracture M81.0    BMI 26.0-26.9,adult Z68.26     Other Visit Diagnoses         Codes    Osteoporosis, unspecified osteoporosis type, unspecified pathological fracture presence    -  Primary M81.0    Relevant Orders    Vitamin D 25-Hydroxy,Total (for eval of Vitamin D levels) (Completed)            Osteoporosis-she was given a prescription for risedronate 150 mg orally once monthly August 2023, but she has not yet started the prescription.  She was advised to get a dental exam first to make sure she does not need any major dental work.  Warned of risk of esophagitis, ONJ, and atypical hip fractures. Advised on proper dosing instructions.  I also recommend check 25-hydroxy vitamin D, as her vitamin D was slightly low at 17 in 2023.    OA both knees-I advised trying Tylenol 1000 mg 3 times daily as needed.  If this is ineffective, she will follow-up for Kenalog injections.    BMI " 26-stable.  She will work on weight reduction.    Plan:  Try tylenol 1000 mg 3x daily as needed for joint pain.  If knee pain does not improve, consider kenalog injection.  Please make dental appointment. Try calling [facility].  After dental exam,I recommend start risedronate 150 mg orally monthly.  Check 25-hydroxy Vitamin D.  Bone density will be repeated 7/25.  Follow up in 1 year.

## 2024-08-12 NOTE — PATIENT INSTRUCTIONS
Try tylenol 1000 mg 3x daily as needed for joint pain.  If knee pain does not improve, consider kenalog injection.  Please make dental appointment. Try calling [facility].  After dental exam,I recommend start risedronate 150 mg orally monthly.  Check 25-hydroxy Vitamin D.  Bone density will be repeated 7/25.  Follow up in 1 year.

## 2024-09-23 ENCOUNTER — APPOINTMENT (OUTPATIENT)
Dept: PRIMARY CARE | Facility: CLINIC | Age: 74
End: 2024-09-23
Payer: MEDICARE

## 2024-10-09 ENCOUNTER — CLINICAL SUPPORT (OUTPATIENT)
Dept: PRIMARY CARE | Facility: CLINIC | Age: 74
End: 2024-10-09
Payer: MEDICARE

## 2024-10-09 DIAGNOSIS — Z00.00 HEALTHCARE MAINTENANCE: ICD-10-CM

## 2024-10-09 PROCEDURE — G0008 ADMIN INFLUENZA VIRUS VAC: HCPCS | Performed by: INTERNAL MEDICINE

## 2024-10-09 PROCEDURE — 90662 IIV NO PRSV INCREASED AG IM: CPT | Performed by: INTERNAL MEDICINE

## 2024-10-25 DIAGNOSIS — E03.9 HYPOTHYROIDISM, UNSPECIFIED TYPE: ICD-10-CM

## 2024-10-25 RX ORDER — LEVOTHYROXINE SODIUM 100 UG/1
100 TABLET ORAL DAILY
Qty: 90 TABLET | Refills: 0 | Status: SHIPPED | OUTPATIENT
Start: 2024-10-25

## 2024-10-25 NOTE — TELEPHONE ENCOUNTER
Patient states pharmacy called ([facility] [address]), 4 days ago regarding a refill on Levothyroxine/100 mcg//patient is down to her last pill, please call back today, when prescription is called into pharmacy.

## 2024-10-25 NOTE — TELEPHONE ENCOUNTER
I spoke to the patient and advised, I sent the Rx request to Dr. Chauhan and will receive it today.

## 2024-12-23 ENCOUNTER — APPOINTMENT (OUTPATIENT)
Dept: PRIMARY CARE | Facility: CLINIC | Age: 74
End: 2024-12-23
Payer: MEDICARE

## 2024-12-23 VITALS
HEART RATE: 82 BPM | OXYGEN SATURATION: 98 % | HEIGHT: 65 IN | DIASTOLIC BLOOD PRESSURE: 84 MMHG | WEIGHT: 163.6 LBS | TEMPERATURE: 97 F | BODY MASS INDEX: 27.26 KG/M2 | SYSTOLIC BLOOD PRESSURE: 128 MMHG

## 2024-12-23 DIAGNOSIS — Z00.00 ROUTINE GENERAL MEDICAL EXAMINATION AT A HEALTH CARE FACILITY: ICD-10-CM

## 2024-12-23 DIAGNOSIS — I10 ESSENTIAL HYPERTENSION: ICD-10-CM

## 2024-12-23 DIAGNOSIS — S16.1XXA STRAIN OF NECK MUSCLE, INITIAL ENCOUNTER: Primary | ICD-10-CM

## 2024-12-23 PROBLEM — M25.511 PAIN OF RIGHT SHOULDER REGION: Status: ACTIVE | Noted: 2024-12-23

## 2024-12-23 PROBLEM — Z86.39 HISTORY OF HYPOTHYROIDISM: Status: ACTIVE | Noted: 2024-12-23

## 2024-12-23 PROBLEM — J06.9 VIRAL UPPER RESPIRATORY TRACT INFECTION: Status: RESOLVED | Noted: 2024-06-19 | Resolved: 2024-12-23

## 2024-12-23 PROBLEM — K52.9 COLITIS: Status: ACTIVE | Noted: 2024-12-23

## 2024-12-23 PROBLEM — Z86.79 HISTORY OF HYPERTENSION: Status: ACTIVE | Noted: 2024-12-23

## 2024-12-23 PROBLEM — R73.03 PREDIABETES: Status: ACTIVE | Noted: 2024-12-23

## 2024-12-23 PROCEDURE — 3008F BODY MASS INDEX DOCD: CPT | Performed by: INTERNAL MEDICINE

## 2024-12-23 PROCEDURE — 1036F TOBACCO NON-USER: CPT | Performed by: INTERNAL MEDICINE

## 2024-12-23 PROCEDURE — G2211 COMPLEX E/M VISIT ADD ON: HCPCS | Performed by: INTERNAL MEDICINE

## 2024-12-23 PROCEDURE — 3078F DIAST BP <80 MM HG: CPT | Performed by: INTERNAL MEDICINE

## 2024-12-23 PROCEDURE — 99214 OFFICE O/P EST MOD 30 MIN: CPT | Performed by: INTERNAL MEDICINE

## 2024-12-23 PROCEDURE — 1160F RVW MEDS BY RX/DR IN RCRD: CPT | Performed by: INTERNAL MEDICINE

## 2024-12-23 PROCEDURE — 3074F SYST BP LT 130 MM HG: CPT | Performed by: INTERNAL MEDICINE

## 2024-12-23 PROCEDURE — 1157F ADVNC CARE PLAN IN RCRD: CPT | Performed by: INTERNAL MEDICINE

## 2024-12-23 PROCEDURE — 1159F MED LIST DOCD IN RCRD: CPT | Performed by: INTERNAL MEDICINE

## 2024-12-23 RX ORDER — MELOXICAM 15 MG/1
15 TABLET ORAL DAILY
Qty: 90 TABLET | Refills: 0 | Status: SHIPPED | OUTPATIENT
Start: 2024-12-23

## 2024-12-23 RX ORDER — MELOXICAM 15 MG/1
15 TABLET ORAL DAILY
COMMUNITY
End: 2024-12-23 | Stop reason: SDUPTHER

## 2024-12-23 ASSESSMENT — ENCOUNTER SYMPTOMS
MUSCULOSKELETAL NEGATIVE: 1
RESPIRATORY NEGATIVE: 1
CARDIOVASCULAR NEGATIVE: 1
CONSTITUTIONAL NEGATIVE: 1
GASTROINTESTINAL NEGATIVE: 1

## 2024-12-23 ASSESSMENT — PATIENT HEALTH QUESTIONNAIRE - PHQ9
2. FEELING DOWN, DEPRESSED OR HOPELESS: NOT AT ALL
1. LITTLE INTEREST OR PLEASURE IN DOING THINGS: NOT AT ALL
SUM OF ALL RESPONSES TO PHQ9 QUESTIONS 1 AND 2: 0

## 2024-12-23 NOTE — PROGRESS NOTES
"Subjective   Patient ID: Evelyne Coley \"Marian\" is a 74 y.o. female who presents for Follow-up.    Here for follow up  Feels well  Has neck pain - bright side - feels stiff - every day  On and off for years  Not taking Meloxicam - but helps when she does      Review of Systems   Constitutional: Negative.    HENT: Negative.     Respiratory: Negative.     Cardiovascular: Negative.    Gastrointestinal: Negative.    Genitourinary: Negative.    Musculoskeletal: Negative.        Objective   Vitals:    12/23/24 1516   BP: 128/84   Pulse:    Temp:    SpO2:      Physical Exam  Vitals and nursing note reviewed.   Cardiovascular:      Rate and Rhythm: Normal rate and regular rhythm.      Heart sounds: Normal heart sounds.   Pulmonary:      Effort: Pulmonary effort is normal.      Breath sounds: Normal breath sounds.   Musculoskeletal:      Cervical back: Normal range of motion.   Neurological:      Mental Status: She is alert.       Assessment & Plan  Strain of neck muscle, initial encounter  Meloxicam daily for 3-4 weeks and then PT    Orders:    meloxicam (Mobic) 15 mg tablet; Take 1 tablet (15 mg) by mouth once daily.    Referral to Physical Therapy; Future    Essential hypertension  Stable  Orders:    CBC; Future    Routine general medical examination at a health care facility    Orders:    CBC; Future    Comprehensive Metabolic Panel; Future    Lipid Panel; Future    TSH with reflex to Free T4 if abnormal; Future    Follow up with me in May  "

## 2024-12-27 ENCOUNTER — APPOINTMENT (OUTPATIENT)
Dept: PHYSICAL THERAPY | Facility: CLINIC | Age: 74
End: 2024-12-27
Payer: MEDICARE

## 2024-12-27 NOTE — PROGRESS NOTES
"Physical Therapy    Physical Therapy Evaluation and Treatment      Patient Name: Evelyne Coley \"Marian\"  MRN: [id number]  Today's Date: 2024    Time Entry: Start time:             End time:     Reason for Visit  Referred Dx:   Referred By:     Insurance Information  Visit #: 1  Approved Visits:   Auth required:     Insurance:  Medicare Certification Date Range:     : verified with patient      Subjective      Chief complaint:     [place]:    PMHx:    Social/Environmental Factors:    PLOF: independent without restrictions    Medications: see chart for full medication list     Medical Screening: Patient denies recent infection/illness, headaches, chest pain, SOB, difficulty speaking/eating/swallowing, recent trauma, difficulty walking, hand weakness, hx of cancer, weight change, unrelenting pain at night, Ds and Ns, clumsiness/dropping objects     Functional Limitations:    Patient goals for PT:    Precautions:     Pain:     At Best: /10  At Worst: /10  Aggravating Factors:  Relieving factors:   Home Living:     Prior Level of Function:       Objective     Observation:  Posture:     Palpation:   TTP:   Spasm/Tightness:    Examination:  Cervical ROM:    Flexion:   Extension:   Rotation:   SB:  Thoracic ROM:    Flexion:   Extension:   Rotation:   SB:  Neuro Exam:  Dermatome exam:  Myotome exam:  Reflexes:  Shoulder ROM (if needed:)   Flexion: R: L:   Abduction: R: L:   Extension: R: L:   ER(functional): R: L:   IR(functional): R: L:   Horizonal Abduction: R: L:   Horizontal Adduction: R: L:  MMT:   Flexion: R:/5L:/5   Abduction: R:/5 L:/5   Extension: R:/5 L:/5   ER(0*): R:/5 L:/5   IR(0*): R:/5 L:/5   ER(): R:/5 L:/5   IR(): R:/5 L:/5   Lower trap: R:/5 L:/5   Middle trap: R:/5 L:/5   Rhomboid: R:/5 L:/5   strength:    Special Tests:   ULTT:   Spurlings Compression test:   Distraction Test:   Sharp Moon:   Alar ligament stress test:   DNF Endurance Testing:   Hoffmans:   Babinski:    Joint Play " Assessment:  Repeated Movements:    Neuro Exam:  Dermatome:  Myotome:    Outcome Measures:  {PT Outcome Measures:06302}       Assessment:      Patient presents with chief complaint of neck pain that has been present since    .Patient notes that    tends to aggravate his/her sxs. Patient demonstrates decreased cervical/thoracic ROM, decreased cervicothoracic strength, impaired posture, and increased neck pain, which limits his/her functional ability. Patient would likely benefit from skilled outpatient PT in order to address the above deficits and return to PLOF.     Plan:        EDUCATION:       Goals:  Patient will demonstrate improvement in NDI by at least 7.5 points in order to meet MCID  Patient will demonstrate full cervical ROM without pain or compensation  Patient will demonstrate at least 4+/5 strength of periscapular musculature  Patient will demonstrate I with HEP  Patient will be able to return to   without pain or compensation     Treatment today:   Initail Eval: ( ***  :  *** min  : ***units), There Ex: ( ***  :  *** min  : ***units)    1. Initial evaluation   2. Pt ed on diagnosis, prognosis, goals of PT, expectations   3. Ther Ex:  3. HEP (see below) ed on normal vs abnormal response    HEP

## 2025-01-18 ENCOUNTER — OFFICE VISIT (OUTPATIENT)
Dept: URGENT CARE | Age: 75
End: 2025-01-18
Payer: MEDICARE

## 2025-01-18 VITALS
OXYGEN SATURATION: 97 % | WEIGHT: 155 LBS | RESPIRATION RATE: 20 BRPM | SYSTOLIC BLOOD PRESSURE: 150 MMHG | TEMPERATURE: 97.4 F | HEART RATE: 84 BPM | BODY MASS INDEX: 25.83 KG/M2 | DIASTOLIC BLOOD PRESSURE: 85 MMHG | HEIGHT: 65 IN

## 2025-01-18 DIAGNOSIS — R68.89 FLU-LIKE SYMPTOMS: ICD-10-CM

## 2025-01-18 DIAGNOSIS — J01.90 ACUTE SINUSITIS, RECURRENCE NOT SPECIFIED, UNSPECIFIED LOCATION: Primary | ICD-10-CM

## 2025-01-18 LAB — POC SARS-COV-2 AG BINAX: NORMAL

## 2025-01-18 RX ORDER — CEFDINIR 300 MG/1
300 CAPSULE ORAL 2 TIMES DAILY
Qty: 14 CAPSULE | Refills: 0 | Status: SHIPPED | OUTPATIENT
Start: 2025-01-18 | End: 2025-01-25

## 2025-01-18 NOTE — PATIENT INSTRUCTIONS
COVID test was negative.        Drink plenty of fluids to help keep your mucus thin.  Apply moist heat (using a hot, damp towel) to your face for 5 to 10 minutes, several times a day.  Breathe warm, moist air from a steamy shower, a hot bath, or a sink filled with hot water. You can drink warm drinks such as tea.   Avoid extremely cool, dry air. Consider using a humidifier to increase the moisture in the air in your home.  Use saltwater nasal washes(saline irrigation) to help keep the nasal passages open and wash out mucus and bacteria.    You may use over-the-counter anti-inflammatory such as ibuprofen, Advil, Aleve for pain. For inflammation, ibuprofen doses include 400-600 mg 2-3 times per day. Tylenol is acceptable to use for pain.    Cough Suppressants or expectorants may be taken over-the-counter if you are having cough with your symptoms. These include Delsym, Robitussin-DM,    You will be started on antibiotic which will be sent to the pharmacy; please complete the regimen as directed even if your symptoms improve. It is recommended to take an Probiotic while on this medication to reduce symptoms of upset stomach, diarrhea, and in women, yeast infections.     Start taking a nasal steroid which may include: Flonase, Nasacort, or Nasonex and use as directed. These medications are now available over the counter.

## 2025-01-18 NOTE — PROGRESS NOTES
"Subjective   Patient ID: Evelyne Coley \"Apollo" is a 74 y.o. female. They present today with a chief complaint of Earache, Cough, and Nasal Congestion (Bilateral ears blocked, cough, runny nose, lack of energy x 3 weeks; stiff neck since before Christmas).    Patient disposition: Home    History of Present Illness  HPI  Cough, runny nose, earache, lack of energy for the past 3 weeks.  Has been taking meloxicam for stiff neck for the past month.  Postnasal drainage, mucus in her throat.  No history of asthma or bronchitis.  No chest congestion or tightness.  Also taking DayQuil, NyQuil, Coricidin.  [place] with cold-like symptoms around the holiday.  Symptoms are not worsening but unable to shake the congestion.  No other complaints or symptoms.  Accompanied by daughter.      Past Medical History  Allergies as of 01/18/2025 - Reviewed 01/18/2025   Allergen Reaction Noted    Penicillins Unknown 05/17/2023    Codeine Rash and Other 05/17/2023       (Not in a hospital admission)       Current Outpatient Medications   Medication Sig Dispense Refill    calcium carbonate (CALCIUM 500 ORAL) Take by mouth once daily.      cholecalciferol, vitamin D3, (VITAMIN D3 ORAL) Take by mouth.      fish oil concentrate (Omega-3) 120-180 mg capsule Take 1 capsule (1 g) by mouth if needed.      levothyroxine (Synthroid, Levoxyl) 100 mcg tablet Take 1 tablet (100 mcg) by mouth once daily. 90 tablet 0    lisinopril 20 mg tablet Take 1 tablet (20 mg) by mouth once daily. 100 tablet 3    meclizine (Antivert) 25 mg tablet Take 1 tablet (25 mg) by mouth 3 times a day as needed for dizziness.      meloxicam (Mobic) 15 mg tablet Take 1 tablet (15 mg) by mouth once daily. 90 tablet 0     No current facility-administered medications for this visit.       Patient Active Problem List   Diagnosis    Borderline hyperlipidemia    Contact dermatitis    Essential hypertension    Hypothyroidism    Knee osteoarthritis    " "Age-related osteoporosis without current pathological fracture    BMI 26.0-26.9,adult    Colitis    History of hypertension    History of hypothyroidism    Pain of right shoulder region    Prediabetes       Past Surgical History:   Procedure Laterality Date    COLONOSCOPY  05/19/2015    Complete Colonoscopy    OTHER SURGICAL HISTORY  01/22/2021    Colonoscopy        reports that she has never smoked. She has never used smokeless tobacco. She reports current alcohol use. She reports that she does not use drugs.    Review of Systems  As noted in HPI. ROS otherwise negative unless noted.       Objective    Vitals:    01/18/25 1445   BP: 150/85   BP Location: Left arm   Patient Position: Sitting   Pulse: 84   Resp: 20   Temp: 36.3 °C (97.4 °F)   SpO2: 97%   Weight: 70.3 kg (155 lb)   Height: 1.651 m (5' 5\")     No LMP recorded. Patient is postmenopausal.    Physical Exam  Constitutional: vital signs reviewed. Well developed, well nourished. patient alert and patient without distress.   Head and Face: Normal and atraumatic.  Palpation of the face and sinuses: Normal.  Ears, Nose, Mouth, and Throat:   Hearing: Normal.  External inspection of nose: Normal.   Lips, teeth, tongue and gums: Normal and well hydrated. External inspection of ears: Normal. Ear canals and TMs: Normal.  Posterior pharynx moist, no exudate, symmetric, no abscess, and with post nasal drip.  Nasal mucosa: Congested  Neck: No neck mass was observed. Supple. normal muscle tone.   Cardiovascular: Heart rate normal, normal S1 and S2, no gallops, no murmurs and no pericardial rub. Rhythm: Normal.  Pulmonary: No respiratory distress. Palpation of chest: Normal. Clear bilateral breath sounds.   Lymphatic: No cervical lymphadenopathy  Psych: Normal mood and affect        Procedures    Point of Care Test & Imaging Results from this visit  Results for orders placed or performed in visit on 01/18/25   POCT Covid-19 Rapid Antigen    Collection Time: 01/18/25  " 3:10 PM   Result Value Ref Range    POC KATHLEEN-COV-2 AG  Presumptive negative test for SARS-CoV-2 (no antigen detected)     Presumptive negative test for SARS-CoV-2 (no antigen detected)            Diagnostic study results (if any) were reviewed.    Assessment/Plan   Allergies, medications, history, and pertinent labs/EKGs/Imaging reviewed.    Medical Decision Making  See note    Orders and Diagnoses  Diagnoses and all orders for this visit:  Subacute cough  -     POCT Covid-19 Rapid Antigen      Medical Admin Record      Follow Up Instructions  No follow-ups on file.    At time of discharge patient was clinically well-appearing and HDS for outpatient management. The patient and/or family was educated regarding diagnosis, supportive care, OTC and Rx medications. The patient and/or family was given the opportunity to ask questions prior to discharge and all questions answered. They verbalized understanding of my discussion of the plans for treatment, expected course, indications to return to  or seek further evaluation in ED, and the need for timely follow up as directed.      Electronically signed by [facility]

## 2025-01-21 DIAGNOSIS — E03.9 HYPOTHYROIDISM, UNSPECIFIED TYPE: ICD-10-CM

## 2025-01-21 RX ORDER — LEVOTHYROXINE SODIUM 100 UG/1
100 TABLET ORAL DAILY
Qty: 90 TABLET | Refills: 2 | Status: SHIPPED | OUTPATIENT
Start: 2025-01-21

## 2025-01-28 ENCOUNTER — EVALUATION (OUTPATIENT)
Dept: PHYSICAL THERAPY | Facility: CLINIC | Age: 75
End: 2025-01-28
Payer: MEDICARE

## 2025-01-28 DIAGNOSIS — M54.2 NECK PAIN: Primary | ICD-10-CM

## 2025-01-28 DIAGNOSIS — S16.1XXA STRAIN OF NECK MUSCLE, INITIAL ENCOUNTER: ICD-10-CM

## 2025-01-28 PROCEDURE — 97110 THERAPEUTIC EXERCISES: CPT | Mod: GP

## 2025-01-28 PROCEDURE — 97161 PT EVAL LOW COMPLEX 20 MIN: CPT | Mod: GP

## 2025-01-28 NOTE — PROGRESS NOTES
"Physical Therapy     Physical Therapy Evaluation        Patient Name: Evelyne Coley \"Marian\"  MRN: [id number]  Today's Date: 1/28/2025  Time Calculation  Start Time: 1418  Stop Time: 1501  Time Calculation (min): 43 min    Reason for Visit  Referred by: Dr. Johnston   Referral DX: neck strain    Insurance:  Visit: #1  [place]ized[place]  Certification: 1-28-25 to 3-14-25  Payor: [employer] / Plan: [employer] / Product Type: *No Product type* /     Diagnosis:  1. Neck pain    2. Strain of neck muscle, initial encounter          Subjective:  Date of Onset: 10-1-24  Chief Complaint: neck pain/stiffness   FERNANDO: gradual onset  HX: history of chronic neck pain over 40 years; \"My neck started bothering me more over the past few months. My primary care gave me medication which has helped some. She also wanted me to start therapy.\"     Prior level of function: no prior limitations  Functional limitations: sleeping, looking over shoulder  Home environment: stairs   Work status/occupation: retired  Recreational activity: gardening, keeping the house     Patient stated goals for treatment: reduce pain     Reviewed medical screening history form with patient: completed 1-28-25      Precautions: no fall risk         Pain:  Location: right side of neck  Nature: ache/sharp  Current pain: 3/10; Range: 0-8/10  Aggravating factor: turning head  Alleviating factor: rest    Objective:  Observation/Posture: rounded shoulders     AROM: cervical: flex: 40, ext: 25, Rot: R 31, L 35    AROM: Right shoulder: flex 135   Left shoulder: flex 142    PROM/mobility: hypomobility OA, AA, R C4-C6    Functional testing: DNF endurance: 3 sec    Outcome Measure:   Other Measures  Neck Disability Index: 10%       Treatment:  Educated pt on course of therapy  Sup suboccipital release x 5  Sup right side glide C4-6 Gr III x 10   Sup chin nod x 10   Sup scap set x 10  Sup AROM cervical rotation x 10    OP Education: HEP: #4-6    Charges: Kaceyal, SHIRA " x 1     Assessment:   Pt presents with dx cervical strain. Demonstrates limited neck ROM with hypomobility. Pt will benefit from therapy to address deficits.     Plan:  Frequency/duration: 1x/wk for 6 weeks  Planned interventions: Therapeutic exercise (ROM, stretching, strengthening), manual therapy, balance/gait training, education and activity modification  Rehab Potential to achieve goals: good     Goals:  Improve NDI > 9 by 6 weeks  Improve AROM cervical rotation to 50 degrees Bilateral to allow improved field of view by 6 weeks  Improve DNF endurance to 15 seconds to improve overhead IADLs by 6 weeks  Resume recreational without sx increase along with sleeping without interruption by 6 weeks    Osvaldo Pretty, PT

## 2025-02-03 ENCOUNTER — TREATMENT (OUTPATIENT)
Dept: PHYSICAL THERAPY | Facility: CLINIC | Age: 75
End: 2025-02-03
Payer: MEDICARE

## 2025-02-03 DIAGNOSIS — M54.2 NECK PAIN: Primary | ICD-10-CM

## 2025-02-03 PROCEDURE — 97110 THERAPEUTIC EXERCISES: CPT | Mod: GP

## 2025-02-03 PROCEDURE — 97140 MANUAL THERAPY 1/> REGIONS: CPT | Mod: GP

## 2025-02-03 NOTE — PROGRESS NOTES
"Physical Therapy    Physical Therapy Treatment    Patient Name: Evelyne Coley \"Marian\"  MRN: [id number]  Today's Date: 2/3/2025  Time Calculation  Start Time: 1335  Stop Time: 1414  Time Calculation (min): 39 min        Insurance:  Visit: #2  [place]  Certification: 1-28-25 to 3-14-25  Payor: [employer] / Plan: [employer] / Product Type: *No Product type* /     Subjective:  \"I was pretty sore after the last visit.  Although, I feel really good today. I do feel more stiff in the morning.\"     Current pain: 2/10; Range: 1-7/10    Objective:  AROM: cervical: flex: 38, ext: 30    Treatment:  Sup suboccipital release x 10 min  Sup right side glide C4-6 Gr III x 10 x 3  Sup chin nod x 10   Sup scap set x 10  Seated AROM cervical rotation x 10    OP Education: HEP: reviewed     Charges: Manual x 2, TE x 1     Diagnosis:  1. Neck pain          Assessment:   Pt demonstrates returns with improving sx reports along with increased cervical spine ROM.     Plan:  Continue with focus on manual therapy to promote cervical mobility.    Osvaldo Pretty, PT  "

## 2025-02-11 ENCOUNTER — TREATMENT (OUTPATIENT)
Dept: PHYSICAL THERAPY | Facility: CLINIC | Age: 75
End: 2025-02-11
Payer: MEDICARE

## 2025-02-11 DIAGNOSIS — M54.2 NECK PAIN: ICD-10-CM

## 2025-02-11 DIAGNOSIS — S16.1XXA STRAIN OF NECK MUSCLE, INITIAL ENCOUNTER: ICD-10-CM

## 2025-02-11 PROCEDURE — 97140 MANUAL THERAPY 1/> REGIONS: CPT | Mod: GP

## 2025-02-11 PROCEDURE — 97110 THERAPEUTIC EXERCISES: CPT | Mod: GP

## 2025-02-11 NOTE — PROGRESS NOTES
"Physical Therapy    Physical Therapy Treatment    Patient Name: Evelyne Coley \"Marian\"  MRN: [id number]  Today's Date: 2/11/2025  Time Calculation  Start Time: 1412  Stop Time: 1452  Time Calculation (min): 40 min        Insurance:  Visit: #3  [place]  Certification: 1-28-25 to 3-14-25  Payor: [employer] / Plan: [employer] / Product Type: *No Product type* /     Subjective:  \"I haven't felt too bad this week. I have been able to pretty do everything this week without trouble.\"     Current pain: 1/10; Range: 1-3/10    Objective:  AROM: cervical: flex: 40, ext: 28    Treatment:  Sup suboccipital release x 10 min  Sup right side glide C4-6 Gr III x 10 x 3  Sup chin nod x 10   Sup scap set x 10  Seated AROM cervical rotation x 10    OP Education: HEP: reviewed     Charges: Manual x 2, TE x 1     Diagnosis:  1. Strain of neck muscle, initial encounter    2. Neck pain          Assessment:   Pt demonstrates returns with improving sx reports along with increased cervical spine ROM.     Plan:  Continue with focus on manual therapy to promote cervical mobility.    Osvaldo Pretty, PT  "

## 2025-02-25 ENCOUNTER — TREATMENT (OUTPATIENT)
Dept: PHYSICAL THERAPY | Facility: CLINIC | Age: 75
End: 2025-02-25
Payer: MEDICARE

## 2025-02-25 DIAGNOSIS — S16.1XXA STRAIN OF NECK MUSCLE, INITIAL ENCOUNTER: ICD-10-CM

## 2025-02-25 DIAGNOSIS — M54.2 NECK PAIN: ICD-10-CM

## 2025-02-25 PROCEDURE — 97140 MANUAL THERAPY 1/> REGIONS: CPT | Mod: GP

## 2025-02-25 PROCEDURE — 97110 THERAPEUTIC EXERCISES: CPT | Mod: GP

## 2025-02-25 NOTE — PROGRESS NOTES
"Physical Therapy    Physical Therapy Treatment    Patient Name: Evelyne Coley \"Marian\"  MRN: [id number]  Today's Date: 2/25/2025  Time Calculation  Start Time: 1417  Stop Time: 1458  Time Calculation (min): 41 min        Insurance:  Visit: #4  [place]  [place]ification: 1-28-25 to 3-14-25  Payor: [employer] / Plan: [employer] / Product Type: *No Product type* /     Subjective:  \"I'm mostly good especially when I'm using the medication. I feel looser today with the Tylenol. I know I still have the stiffness and I get pain when I turn my head.\"     Current pain: 2/10; Range: 1-5/10    Objective:  AROM: cervical: flex: 45, ext: 32    Treatment:  Sup suboccipital release x 10 min  Sup right side glide C4-6 Gr III x 10 x 3  Sup chin nod x 10   Sup scap set x 10  Sup cervical rotation x 10  Seated thoracic ext x 10  Seated scap set x 10  Seated thoracic/cervical rotation x 10   Standing row with red band x 10     OP Education: HEP: #9    Charges: Manual x 2, TE x 1     Diagnosis:  1. Strain of neck muscle, initial encounter    2. Neck pain          Assessment:   Pt demonstrates returns with improving sx reports along with increased cervical spine ROM.     Plan:  Continue with focus on manual therapy to promote cervical mobility.    Osvaldo Pretty, PT  "

## 2025-02-28 ENCOUNTER — APPOINTMENT (OUTPATIENT)
Dept: RADIOLOGY | Facility: HOSPITAL | Age: 75
End: 2025-02-28
Payer: MEDICARE

## 2025-02-28 ENCOUNTER — HOSPITAL ENCOUNTER (EMERGENCY)
Facility: HOSPITAL | Age: 75
Discharge: HOME | End: 2025-02-28
Attending: EMERGENCY MEDICINE
Payer: MEDICARE

## 2025-02-28 ENCOUNTER — APPOINTMENT (OUTPATIENT)
Dept: CARDIOLOGY | Facility: HOSPITAL | Age: 75
End: 2025-02-28
Payer: MEDICARE

## 2025-02-28 VITALS
HEIGHT: 65 IN | HEART RATE: 77 BPM | WEIGHT: 140 LBS | DIASTOLIC BLOOD PRESSURE: 81 MMHG | OXYGEN SATURATION: 96 % | RESPIRATION RATE: 18 BRPM | TEMPERATURE: 97.9 F | SYSTOLIC BLOOD PRESSURE: 169 MMHG | BODY MASS INDEX: 23.32 KG/M2

## 2025-02-28 DIAGNOSIS — K59.00 CONSTIPATION, UNSPECIFIED CONSTIPATION TYPE: Primary | ICD-10-CM

## 2025-02-28 LAB
ALBUMIN SERPL BCP-MCNC: 4.8 G/DL (ref 3.4–5)
ALP SERPL-CCNC: 90 U/L (ref 33–136)
ALT SERPL W P-5'-P-CCNC: 15 U/L (ref 7–45)
ANION GAP SERPL CALC-SCNC: 14 MMOL/L (ref 10–20)
AST SERPL W P-5'-P-CCNC: 17 U/L (ref 9–39)
BASOPHILS # BLD AUTO: 0.03 X10*3/UL (ref 0–0.1)
BASOPHILS NFR BLD AUTO: 0.3 %
BILIRUB SERPL-MCNC: 0.7 MG/DL (ref 0–1.2)
BUN SERPL-MCNC: 12 MG/DL (ref 6–23)
CALCIUM SERPL-MCNC: 10.1 MG/DL (ref 8.6–10.3)
CARDIAC TROPONIN I PNL SERPL HS: <3 NG/L (ref 0–13)
CHLORIDE SERPL-SCNC: 103 MMOL/L (ref 98–107)
CO2 SERPL-SCNC: 24 MMOL/L (ref 21–32)
CREAT SERPL-MCNC: 0.8 MG/DL (ref 0.5–1.05)
EGFRCR SERPLBLD CKD-EPI 2021: 77 ML/MIN/1.73M*2
EOSINOPHIL # BLD AUTO: 0.06 X10*3/UL (ref 0–0.4)
EOSINOPHIL NFR BLD AUTO: 0.6 %
ERYTHROCYTE [DISTWIDTH] IN BLOOD BY AUTOMATED COUNT: 13.7 % (ref 11.5–14.5)
GLUCOSE SERPL-MCNC: 111 MG/DL (ref 74–99)
HCT VFR BLD AUTO: 44.3 % (ref 36–46)
HGB BLD-MCNC: 14.8 G/DL (ref 12–16)
IMM GRANULOCYTES # BLD AUTO: 0.03 X10*3/UL (ref 0–0.5)
IMM GRANULOCYTES NFR BLD AUTO: 0.3 % (ref 0–0.9)
LACTATE SERPL-SCNC: 1.3 MMOL/L (ref 0.4–2)
LIPASE SERPL-CCNC: 30 U/L (ref 9–82)
LYMPHOCYTES # BLD AUTO: 1.28 X10*3/UL (ref 0.8–3)
LYMPHOCYTES NFR BLD AUTO: 11.8 %
MAGNESIUM SERPL-MCNC: 2.05 MG/DL (ref 1.6–2.4)
MCH RBC QN AUTO: 31 PG (ref 26–34)
MCHC RBC AUTO-ENTMCNC: 33.4 G/DL (ref 32–36)
MCV RBC AUTO: 93 FL (ref 80–100)
MONOCYTES # BLD AUTO: 0.86 X10*3/UL (ref 0.05–0.8)
MONOCYTES NFR BLD AUTO: 8 %
NEUTROPHILS # BLD AUTO: 8.55 X10*3/UL (ref 1.6–5.5)
NEUTROPHILS NFR BLD AUTO: 79 %
NRBC BLD-RTO: 0 /100 WBCS (ref 0–0)
PHOSPHATE SERPL-MCNC: 3.1 MG/DL (ref 2.5–4.9)
PLATELET # BLD AUTO: 294 X10*3/UL (ref 150–450)
POTASSIUM SERPL-SCNC: 3.9 MMOL/L (ref 3.5–5.3)
PROT SERPL-MCNC: 8.3 G/DL (ref 6.4–8.2)
RBC # BLD AUTO: 4.78 X10*6/UL (ref 4–5.2)
SODIUM SERPL-SCNC: 137 MMOL/L (ref 136–145)
TSH SERPL-ACNC: 1.26 MIU/L (ref 0.44–3.98)
WBC # BLD AUTO: 10.8 X10*3/UL (ref 4.4–11.3)

## 2025-02-28 PROCEDURE — 83690 ASSAY OF LIPASE: CPT | Performed by: EMERGENCY MEDICINE

## 2025-02-28 PROCEDURE — 85025 COMPLETE CBC W/AUTO DIFF WBC: CPT | Performed by: EMERGENCY MEDICINE

## 2025-02-28 PROCEDURE — 36415 COLL VENOUS BLD VENIPUNCTURE: CPT | Performed by: EMERGENCY MEDICINE

## 2025-02-28 PROCEDURE — 83605 ASSAY OF LACTIC ACID: CPT | Performed by: EMERGENCY MEDICINE

## 2025-02-28 PROCEDURE — 84443 ASSAY THYROID STIM HORMONE: CPT | Performed by: EMERGENCY MEDICINE

## 2025-02-28 PROCEDURE — 74018 RADEX ABDOMEN 1 VIEW: CPT

## 2025-02-28 PROCEDURE — 93005 ELECTROCARDIOGRAM TRACING: CPT

## 2025-02-28 PROCEDURE — 80053 COMPREHEN METABOLIC PANEL: CPT | Performed by: EMERGENCY MEDICINE

## 2025-02-28 PROCEDURE — 83735 ASSAY OF MAGNESIUM: CPT | Performed by: EMERGENCY MEDICINE

## 2025-02-28 PROCEDURE — 2500000001 HC RX 250 WO HCPCS SELF ADMINISTERED DRUGS (ALT 637 FOR MEDICARE OP): Performed by: EMERGENCY MEDICINE

## 2025-02-28 PROCEDURE — 84484 ASSAY OF TROPONIN QUANT: CPT | Performed by: EMERGENCY MEDICINE

## 2025-02-28 PROCEDURE — 2550000001 HC RX 255 CONTRASTS: Performed by: EMERGENCY MEDICINE

## 2025-02-28 PROCEDURE — 74177 CT ABD & PELVIS W/CONTRAST: CPT

## 2025-02-28 PROCEDURE — 99285 EMERGENCY DEPT VISIT HI MDM: CPT | Performed by: EMERGENCY MEDICINE

## 2025-02-28 PROCEDURE — 71045 X-RAY EXAM CHEST 1 VIEW: CPT

## 2025-02-28 PROCEDURE — 99285 EMERGENCY DEPT VISIT HI MDM: CPT | Mod: 25 | Performed by: EMERGENCY MEDICINE

## 2025-02-28 PROCEDURE — 84100 ASSAY OF PHOSPHORUS: CPT | Performed by: EMERGENCY MEDICINE

## 2025-02-28 RX ORDER — POLYETHYLENE GLYCOL 3350 17 G/17G
17 POWDER, FOR SOLUTION ORAL DAILY
Qty: 510 G | Refills: 0 | Status: SHIPPED | OUTPATIENT
Start: 2025-02-28 | End: 2025-03-30

## 2025-02-28 RX ORDER — ACETAMINOPHEN 325 MG/1
975 TABLET ORAL ONCE
Status: COMPLETED | OUTPATIENT
Start: 2025-02-28 | End: 2025-02-28

## 2025-02-28 RX ADMIN — IOHEXOL 75 ML: 350 INJECTION, SOLUTION INTRAVENOUS at 17:46

## 2025-02-28 RX ADMIN — ACETAMINOPHEN 975 MG: 325 TABLET ORAL at 16:34

## 2025-02-28 ASSESSMENT — PAIN DESCRIPTION - LOCATION: LOCATION: ABDOMEN

## 2025-02-28 ASSESSMENT — COLUMBIA-SUICIDE SEVERITY RATING SCALE - C-SSRS
1. IN THE PAST MONTH, HAVE YOU WISHED YOU WERE DEAD OR WISHED YOU COULD GO TO SLEEP AND NOT WAKE UP?: NO
6. HAVE YOU EVER DONE ANYTHING, STARTED TO DO ANYTHING, OR PREPARED TO DO ANYTHING TO END YOUR LIFE?: NO
2. HAVE YOU ACTUALLY HAD ANY THOUGHTS OF KILLING YOURSELF?: NO

## 2025-02-28 ASSESSMENT — LIFESTYLE VARIABLES
EVER FELT BAD OR GUILTY ABOUT YOUR DRINKING: NO
HAVE PEOPLE ANNOYED YOU BY CRITICIZING YOUR DRINKING: NO
EVER HAD A DRINK FIRST THING IN THE MORNING TO STEADY YOUR NERVES TO GET RID OF A HANGOVER: NO
HAVE YOU EVER FELT YOU SHOULD CUT DOWN ON YOUR DRINKING: NO
TOTAL SCORE: 0

## 2025-02-28 ASSESSMENT — PAIN SCALES - GENERAL
PAINLEVEL_OUTOF10: 2
PAINLEVEL_OUTOF10: 4
PAINLEVEL_OUTOF10: 0 - NO PAIN

## 2025-02-28 ASSESSMENT — PAIN - FUNCTIONAL ASSESSMENT
PAIN_FUNCTIONAL_ASSESSMENT: 0-10
PAIN_FUNCTIONAL_ASSESSMENT: 0-10

## 2025-02-28 NOTE — ED PROVIDER NOTES
EMERGENCY DEPARTMENT ENCOUNTER      Pt Name: Evelyne Coley  MRN: [id number]  Birthdate 1950  Date of evaluation: 2/28/2025  Provider: Krish Merlos DO    CHIEF COMPLAINT       Chief Complaint   Patient presents with    Constipation     Pt c/o constipation for 3 days. Pt denies any n/v, fevers or other sx. Pt states that she has pain only when she is trying to have a BM.      HISTORY OF PRESENT ILLNESS    Evelyne Coley is a 74 y.o. year old female who presents to the ER for constipation. Last BM Tuesday. Tried to go Wednesday, just had pain. Has had rectal pain when trying to pass stool. Has only had smudge on toilet paper, no stool in the bowl. Last passed gas yesterday. Denies abdominal pain, nausea, vomiting, chest pain, urinary changes, fevers, fainting.  Normally has a BM twice daily.  No history of constipation  Has been taking miralax, last night and this morning. Took 2 colace yesterday as well.   Has taken her BP meds today    PMH  hypothyroid, HTN, low vitaminD, arthritis  On synthroid, D3, calcium, lisinopril, meloxicam PRN  PSH none  Allergic to codeine, penicillin  No hx tobacco, alcohol, or drug use    PAST MEDICAL HISTORY     Past Medical History:   Diagnosis Date    Acute upper respiratory infection, unspecified 11/20/2019    Acute URI    Encounter for screening mammogram for malignant neoplasm of breast     Visit for screening mammogram    Hyperlipidemia, unspecified     Dyslipidemia    Noninfective gastroenteritis and colitis, unspecified 02/25/2015    Colitis    Other conditions influencing health status     Screening for malignant neoplasms, colon    Other specified disorders of left ear 07/26/2016    Congestion of left ear    Pain in left knee 03/14/2019    Left knee pain    Pain in right knee 09/19/2017    Chronic pain of right knee    Personal history of other diseases of the circulatory system 09/30/2014    History of hypertension    Personal history of other diseases of the  musculoskeletal system and connective tissue 09/30/2014    History of backache    Personal history of other diseases of the respiratory system 01/20/2015    History of upper respiratory infection    Personal history of other diseases of the respiratory system 01/19/2015    History of acute sinusitis    Personal history of other endocrine, nutritional and metabolic disease     History of hypothyroidism    Personal history of other specified conditions 02/25/2015    History of abdominal pain    Personal history of urinary (tract) infections     History of urinary tract infection    Prediabetes     Prediabetes    Strain of muscle, fascia and tendon of lower back, initial encounter 12/31/2018    Lumbar strain    Streptococcal pharyngitis 06/23/2018    Strep pharyngitis    Streptococcal pharyngitis 06/26/2018    Strep pharyngitis    Unilateral primary osteoarthritis, right knee 12/31/2018    Primary osteoarthritis of right knee     CURRENT MEDICATIONS       Discharge Medication List as of 2/28/2025  7:32 PM        CONTINUE these medications which have NOT CHANGED    Details   calcium carbonate (CALCIUM 500 ORAL) Take by mouth once daily., Historical Med      cholecalciferol, vitamin D3, (VITAMIN D3 ORAL) Take by mouth., Historical Med      fish oil concentrate (Omega-3) 120-180 mg capsule Take 1 capsule (1 g) by mouth if needed., Starting Thu 1/10/2013, Historical Med      levothyroxine (Synthroid, Levoxyl) 100 mcg tablet Take 1 tablet (100 mcg) by mouth once daily., Starting Tue 1/21/2025, Normal      lisinopril 20 mg tablet Take 1 tablet (20 mg) by mouth once daily., Starting Thu 5/23/2024, Until Fri 6/27/2025, Normal      meclizine (Antivert) 25 mg tablet Take 1 tablet (25 mg) by mouth 3 times a day as needed for dizziness., Starting Sat 7/30/2022, Historical Med      meloxicam (Mobic) 15 mg tablet Take 1 tablet (15 mg) by mouth once daily., Starting Mon 12/23/2024, Normal           SURGICAL HISTORY       Past  Surgical History:   Procedure Laterality Date    COLONOSCOPY  05/19/2015    Complete Colonoscopy    OTHER SURGICAL HISTORY  01/22/2021    Colonoscopy     ALLERGIES     Penicillins and Codeine  FAMILY HISTORY       Family History   Problem Relation Name Age of Onset    Hypertension Mother      Prostate cancer Father      Breast cancer Father's Sister       SOCIAL HISTORY       Social History     Tobacco Use    Smoking status: Never    Smokeless tobacco: Never   Vaping Use    Vaping status: Never Used   Substance Use Topics    Alcohol use: Yes     Comment: rare    Drug use: Never     PHYSICAL EXAM  (up to 7 for level 4, 8 or more for level 5)     ED Triage Vitals [02/28/25 1439]   Temperature Heart Rate Respirations BP   36.6 °C (97.9 °F) 98 18 (!) 187/95      Pulse Ox Temp Source Heart Rate Source Patient Position   96 % Temporal Monitor Sitting      BP Location FiO2 (%)     Right arm --       Physical Exam  Vitals and nursing note reviewed.   Constitutional:       General: She is not in acute distress.     Appearance: Normal appearance. She is not ill-appearing.   HENT:      Head: Normocephalic and atraumatic. No raccoon eyes, Patel's sign, contusion or laceration.      Jaw: No trismus or malocclusion.      Right Ear: External ear normal.      Left Ear: External ear normal.      Mouth/Throat:      Mouth: Mucous membranes are moist.      Pharynx: Oropharynx is clear.   Eyes:      Extraocular Movements: Extraocular movements intact.      Pupils: Pupils are equal, round, and reactive to light.   Neck:      Trachea: No tracheal deviation.   Cardiovascular:      Rate and Rhythm: Normal rate and regular rhythm.      Pulses: Normal pulses.   Pulmonary:      Effort: No respiratory distress.      Breath sounds: No wheezing, rhonchi or rales.   Chest:      Chest wall: No tenderness.   Abdominal:      General: Abdomen is flat.      Palpations: Abdomen is soft. There is no mass.      Tenderness: There is no abdominal  tenderness.   Musculoskeletal:         General: No tenderness or signs of injury.      Right lower leg: No edema.      Left lower leg: No edema.   Skin:     Coloration: Skin is not jaundiced or pale.      Findings: No petechiae, rash or wound.   Neurological:      Mental Status: She is alert.        DIAGNOSTIC RESULTS   LABS:  Labs Reviewed   CBC WITH AUTO DIFFERENTIAL - Abnormal       Result Value    WBC 10.8      nRBC 0.0      RBC 4.78      Hemoglobin 14.8      Hematocrit 44.3      MCV 93      MCH 31.0      MCHC 33.4      RDW 13.7      Platelets 294      Neutrophils % 79.0      Immature Granulocytes %, Automated 0.3      Lymphocytes % 11.8      Monocytes % 8.0      Eosinophils % 0.6      Basophils % 0.3      Neutrophils Absolute 8.55 (*)     Immature Granulocytes Absolute, Automated 0.03      Lymphocytes Absolute 1.28      Monocytes Absolute 0.86 (*)     Eosinophils Absolute 0.06      Basophils Absolute 0.03     COMPREHENSIVE METABOLIC PANEL - Abnormal    Glucose 111 (*)     Sodium 137      Potassium 3.9      Chloride 103      Bicarbonate 24      Anion Gap 14      Urea Nitrogen 12      Creatinine 0.80      eGFR 77      Calcium 10.1      Albumin 4.8      Alkaline Phosphatase 90      Total Protein 8.3 (*)     AST 17      Bilirubin, Total 0.7      ALT 15     LIPASE - Normal    Lipase 30      Narrative:     Venipuncture immediately after or during the administration of Metamizole may lead to falsely low results. Testing should be performed immediately prior to Metamizole dosing.   LACTATE - Normal    Lactate 1.3      Narrative:     Venipuncture immediately after or during the administration of Metamizole may lead to falsely low results. Testing should be performed immediately prior to Metamizole dosing.   TROPONIN I, HIGH SENSITIVITY - Normal    Troponin I, High Sensitivity <3      Narrative:     Less than 99th percentile of normal range cutoff-  Female and children under 18 years old <14 ng/L; Male <21 ng/L:  Negative  Repeat testing should be performed if clinically indicated.     Female and children under 18 years old 14-50 ng/L; Male 21-50 ng/L:  Consistent with possible cardiac damage and possible increased clinical   risk. Serial measurements may help to assess extent of myocardial damage.     >50 ng/L: Consistent with cardiac damage, increased clinical risk and  myocardial infarction. Serial measurements may help assess extent of   myocardial damage.      NOTE: Children less than 1 year old may have higher baseline troponin   levels and results should be interpreted in conjunction with the overall   clinical context.     NOTE: Troponin I testing is performed using a different   testing methodology at [facility] than at other   [facility]. Direct result comparisons should only   be made within the same method.   MAGNESIUM - Normal    Magnesium 2.05     PHOSPHORUS - Normal    Phosphorus 3.1     TSH WITH REFLEX TO FREE T4 IF ABNORMAL - Normal    Thyroid Stimulating Hormone 1.26      Narrative:     TSH testing is performed using different testing methodology at [facility] than at other [facility]. Direct result comparisons should only be made within the same method.       All other labs were within normal range or not returned as of this dictation.  Imaging  CT abdomen pelvis w IV contrast   Final Result   1.  There is large amount of stool in the rectum suggestive of   constipation. Liquid stool throughout the proximal colon without wall   thickening or adjacent inflammatory change. Mildly distended urinary   bladder without wall thickening or inflammatory change.             Signed by: Sebas Giron 2/28/2025 6:12 PM   Dictation workstation:   [id number]      XR chest 1 view   Final Result   New mild bibasilar opacities, which could correspond to atelectasis   or infiltrate.        Signed by: Mima Wade 2/28/2025 4:46 PM   Dictation workstation:   [id number]      XR  abdomen 1 view   Final Result   Moderate rectal stool volume. Nonobstructive bowel gas pattern.        MACRO:   None        Signed by: Mima Wade 2/28/2025 3:46 PM   Dictation workstation:   [id number]         Procedure  Procedures  EMERGENCY DEPARTMENT COURSE/MDM:   Medical Decision Making    Vitals:    Vitals:    02/28/25 1717 02/28/25 1825 02/28/25 1830 02/28/25 1938   BP: 157/68 157/73  169/81   BP Location: Right arm Right arm  Right arm   Patient Position: Sitting Sitting  Sitting   Pulse: 77 82 88 77   Resp: 18 18  18   Temp:       TempSrc:       SpO2: 97% 97% 95% 96%   Weight:       Height:         Evelyne Coley is a female 74 y.o. who presents to the ER for new onset constipation. On arrival the patients vital signs were: Afebrile, Reguar HR, Hypertensive, Regular RR, and Normoxic on room air. History obtained from: patient and Daughter.  Given 74-year-old female with new onset constipation, reported obstipation x 1, history of hypothyroidism, day plan for CBC, CMP, mag, Phos, lipase, TSH, CT abdomen pelvis IV contrast to assess for underlying cause of constipation.  Patient intermittently tachycardic, basic cardiac workup obtained as well.  ED Course as of 02/28/25 2155 Fri Feb 28, 2025   1639 XR abdomen 1 view  Stool ball visualized in rectum, otherwise no pneumoperitoneum, air-fluid levels, pneumatosis or fractures. [CB]   1656 CBC and Auto Differential(!)  No acute leukocytosis, leukopenia, thrombocytosis, thrombocytopenia, or anemia [CB]   1715 Troponin I, High Sensitivity: <3  Not elevated doubt acs or myopericarditis [CB]   1716 LIPASE: 30  Not greater than 3 times upper normal limit doubt pancreatitis [CB]   1716 Comprehensive metabolic panel(!)  Normoglycemic, no acute electrolyte or hepatorenal abnormality [CB]   1716 Lactate: 1.3  Not elevated doubt malperfusion [CB]   1716 XR chest 1 view  New mild bibasilar opacities, which could correspond to atelectasis  or infiltrate.   [CB]   1826  Thyroid Stimulating Hormone: 1.26  Euthyroid [CB]   1827 CT abdomen pelvis w IV contrast  1.  There is large amount of stool in the rectum suggestive of  constipation. Liquid stool throughout the proximal colon without wall  thickening or adjacent inflammatory change. Mildly distended urinary  bladder without wall thickening or inflammatory change.   [CB]   1840 Disimpaction: Consent obtained. Nurse present in the room for exam. Normal rectal tone. Large amount of soft stool present in vault, disimpaction aborted in favor of enema [CB]   1928 Soap suds enema produced large BM [CB]      ED Course User Index  [CB] Krish Merlos DO         Diagnoses as of 02/28/25 2155   Constipation, unspecified constipation type     External Records Reviewed: I reviewed recent and relevant outside records including inpatient notes, outpatient records  Prescription Drug Consideration: MiraLAX prescribed for constipation    Shared decision making for disposition  Patient and/or patient´s representative was counseled regarding labs, imaging, likely diagnosis. All questions were answered. Recommendation was made   for discharge home. The patient agreed and was discharged home in stable condition with appropriate relevant educational materials. Return precautions were provided which included new or worsening abdominal pain, persistent vomiting, persistent diarrhea, black tar stools, fever of 38C (100.4) or higher, chest pain, shortness of breath, or worsening of your current symptoms..     ED Medications administered this visit:    Medications   acetaminophen (Tylenol) tablet 975 mg (975 mg oral Given 2/28/25 1634)   iohexol (OMNIPaque) 350 mg iodine/mL solution 75 mL (75 mL intravenous Given 2/28/25 1746)       New Prescriptions from this visit:    Discharge Medication List as of 2/28/2025  7:32 PM        START taking these medications    Details   polyethylene glycol (Glycolax, Miralax) 17 gram/dose powder Mix 17 g of powder and  drink once daily., Starting Fri 2/28/2025, Until Sun 3/30/2025, Normal             Follow-up: GI referral provided     Final Impression:   1. Constipation, unspecified constipation type          Please excuse any misspellings or unintended errors related to the Dragon speech recognition software used to dictate this note.    I reviewed the case with the attending ED physician. The attending ED physician agrees with the plan.      Krish Merlos DO  Resident  02/28/25 4216

## 2025-03-01 LAB
ATRIAL RATE: 90 BPM
P AXIS: 64 DEGREES
P OFFSET: 196 MS
P ONSET: 141 MS
PR INTERVAL: 168 MS
Q ONSET: 225 MS
QRS COUNT: 15 BEATS
QRS DURATION: 66 MS
QT INTERVAL: 366 MS
QTC CALCULATION(BAZETT): 447 MS
QTC FREDERICIA: 419 MS
R AXIS: 29 DEGREES
T AXIS: -4 DEGREES
T OFFSET: 408 MS
VENTRICULAR RATE: 90 BPM

## 2025-03-01 NOTE — DISCHARGE INSTRUCTIONS
The goal of constipation treatment is to have a very soft bowel movement once a day. The consistency of the bowel movement should be like soft-serve ice cream or applesauce.     Miralax is a safe medication for adults and children >6 months old to take to help relieve acute constipation and maintain a healthy bowel regimen. It may be purchased over the counter or obtained through a pharmacy with a prescription. It is a powder that dissolves easily in 8oz of liquid.     Starting Dose: 1 capful daily (17g)     Take this dose for 3 days. On the third day adjust the dose as follows:     If the stool is still hard and less than once a day, double the dose.     If the stool is like soft-serve ice cream or applesauce and once a day, keep the current dose.     If the stool is entirely liquid or more than once per day, halve the current dose.     If you have adjusted the dose on the third day, wait three more days to see the effect of the new dose and contact your primary care doctor to inform them of the change.      Please return to the ER or seek immediate medical attention if you experience new or worsening abdominal pain, persistent vomiting, persistent diarrhea, black tar stools, fever of 38C (100.4) or higher, chest pain, shortness of breath, or worsening of your current symptoms.    You are welcome back any time. Thank you for entrusting your care to us, I hope we made your visit as pleasant as possible. Wishing you well!    Dr. Merlos

## 2025-03-03 ENCOUNTER — APPOINTMENT (OUTPATIENT)
Dept: PHYSICAL THERAPY | Facility: CLINIC | Age: 75
End: 2025-03-03
Payer: MEDICARE

## 2025-03-24 ENCOUNTER — TELEPHONE (OUTPATIENT)
Dept: PRIMARY CARE | Facility: CLINIC | Age: 75
End: 2025-03-24
Payer: MEDICARE

## 2025-03-24 DIAGNOSIS — S16.1XXA STRAIN OF NECK MUSCLE, INITIAL ENCOUNTER: ICD-10-CM

## 2025-03-24 RX ORDER — MELOXICAM 15 MG/1
15 TABLET ORAL DAILY
Qty: 90 TABLET | Refills: 3 | Status: SHIPPED | OUTPATIENT
Start: 2025-03-24

## 2025-03-27 ENCOUNTER — OFFICE VISIT (OUTPATIENT)
Dept: PRIMARY CARE | Facility: CLINIC | Age: 75
End: 2025-03-27
Payer: MEDICARE

## 2025-03-27 VITALS
BODY MASS INDEX: 23.66 KG/M2 | HEART RATE: 69 BPM | HEIGHT: 65 IN | OXYGEN SATURATION: 95 % | DIASTOLIC BLOOD PRESSURE: 82 MMHG | WEIGHT: 142 LBS | SYSTOLIC BLOOD PRESSURE: 162 MMHG | TEMPERATURE: 96.9 F

## 2025-03-27 DIAGNOSIS — R22.1 NECK SWELLING: ICD-10-CM

## 2025-03-27 DIAGNOSIS — M54.2 NECK PAIN: Primary | ICD-10-CM

## 2025-03-27 PROCEDURE — 3077F SYST BP >= 140 MM HG: CPT | Performed by: INTERNAL MEDICINE

## 2025-03-27 PROCEDURE — 1036F TOBACCO NON-USER: CPT | Performed by: INTERNAL MEDICINE

## 2025-03-27 PROCEDURE — G2211 COMPLEX E/M VISIT ADD ON: HCPCS | Performed by: INTERNAL MEDICINE

## 2025-03-27 PROCEDURE — 3008F BODY MASS INDEX DOCD: CPT | Performed by: INTERNAL MEDICINE

## 2025-03-27 PROCEDURE — 3079F DIAST BP 80-89 MM HG: CPT | Performed by: INTERNAL MEDICINE

## 2025-03-27 PROCEDURE — 99213 OFFICE O/P EST LOW 20 MIN: CPT | Performed by: INTERNAL MEDICINE

## 2025-03-27 PROCEDURE — 1159F MED LIST DOCD IN RCRD: CPT | Performed by: INTERNAL MEDICINE

## 2025-03-27 PROCEDURE — 1157F ADVNC CARE PLAN IN RCRD: CPT | Performed by: INTERNAL MEDICINE

## 2025-03-27 RX ORDER — CYCLOBENZAPRINE HCL 5 MG
5 TABLET ORAL NIGHTLY PRN
Qty: 30 TABLET | Refills: 0 | Status: SHIPPED | OUTPATIENT
Start: 2025-03-27 | End: 2025-05-26

## 2025-03-27 RX ORDER — PREDNISONE 20 MG/1
TABLET ORAL
Qty: 15 TABLET | Refills: 0 | Status: SHIPPED | OUTPATIENT
Start: 2025-03-27 | End: 2025-04-08

## 2025-03-27 ASSESSMENT — PATIENT HEALTH QUESTIONNAIRE - PHQ9
1. LITTLE INTEREST OR PLEASURE IN DOING THINGS: NOT AT ALL
SUM OF ALL RESPONSES TO PHQ9 QUESTIONS 1 AND 2: 0
2. FEELING DOWN, DEPRESSED OR HOPELESS: NOT AT ALL

## 2025-03-27 NOTE — PROGRESS NOTES
"Subjective   Patient ID: Evelyne Coley \"Marian\" is a 74 y.o. female who presents for Neck Pain.  HPI  Here with 3 months of neck pain  I saw her in Dec and started Meloxicam  Started PT at the end of Jan and has had 3 visits with minimal improvement    Review of Systems    Objective   Vitals:    03/27/25 1601   BP: 162/82   Pulse: 69   Temp: 36.1 °C (96.9 °F)   SpO2: 95%     Physical Exam  Neck:        Comments: Swelling over the anterior belly of the sternocleidomastoid   Musculoskeletal:      Cervical back: Edema present. No erythema. Pain with movement present. Decreased range of motion.           Assessment & Plan  Neck pain  Continue PT  Orders:    predniSONE (Deltasone) 20 mg tablet; Take 2 tablets (40 mg) by mouth once daily for 3 days, THEN 1.5 tablets (30 mg) once daily for 3 days, THEN 1 tablet (20 mg) once daily for 3 days, THEN 0.5 tablets (10 mg) once daily for 3 days.    cyclobenzaprine (Flexeril) 5 mg tablet; Take 1 tablet (5 mg) by mouth as needed at bedtime for muscle spasms.    Neck swelling  Swelling over the anterior belly of SCM - not clear etiology  Orders:    CT soft tissue neck wo IV contrast; Future    Follow up with me in May as scheduled  "

## 2025-03-27 NOTE — ASSESSMENT & PLAN NOTE
Continue PT  Orders:    predniSONE (Deltasone) 20 mg tablet; Take 2 tablets (40 mg) by mouth once daily for 3 days, THEN 1.5 tablets (30 mg) once daily for 3 days, THEN 1 tablet (20 mg) once daily for 3 days, THEN 0.5 tablets (10 mg) once daily for 3 days.    cyclobenzaprine (Flexeril) 5 mg tablet; Take 1 tablet (5 mg) by mouth as needed at bedtime for muscle spasms.

## 2025-04-03 ENCOUNTER — APPOINTMENT (OUTPATIENT)
Dept: RADIOLOGY | Facility: CLINIC | Age: 75
End: 2025-04-03
Payer: MEDICARE

## 2025-04-04 ENCOUNTER — TELEPHONE (OUTPATIENT)
Dept: PRIMARY CARE | Facility: CLINIC | Age: 75
End: 2025-04-04
Payer: MEDICARE

## 2025-04-04 DIAGNOSIS — R22.1 NECK SWELLING: Primary | ICD-10-CM

## 2025-04-04 NOTE — TELEPHONE ENCOUNTER
Patient states that the order for Monday needed to state CT with contrast. Dr. Del Angel put in a new order for a CT with contrast as well as a creatin lab order. Spoke to patient, and she will get the lab work and CT done and also reach out to radiology to advise that a new order has been placed.

## 2025-04-05 LAB
CREAT SERPL-MCNC: 1.13 MG/DL (ref 0.6–1)
EGFRCR SERPLBLD CKD-EPI 2021: 51 ML/MIN/1.73M2

## 2025-04-07 ENCOUNTER — HOSPITAL ENCOUNTER (OUTPATIENT)
Dept: RADIOLOGY | Facility: CLINIC | Age: 75
Discharge: HOME | End: 2025-04-07
Payer: MEDICARE

## 2025-04-07 ENCOUNTER — HOSPITAL ENCOUNTER (OUTPATIENT)
Dept: RADIOLOGY | Facility: CLINIC | Age: 75
End: 2025-04-07
Payer: MEDICARE

## 2025-04-07 DIAGNOSIS — R22.1 NECK SWELLING: ICD-10-CM

## 2025-04-07 PROCEDURE — 2550000001 HC RX 255 CONTRASTS: Performed by: INTERNAL MEDICINE

## 2025-04-07 PROCEDURE — 70491 CT SOFT TISSUE NECK W/DYE: CPT

## 2025-04-07 PROCEDURE — 70491 CT SOFT TISSUE NECK W/DYE: CPT | Performed by: RADIOLOGY

## 2025-04-07 RX ADMIN — IOHEXOL 75 ML: 350 INJECTION, SOLUTION INTRAVENOUS at 14:45

## 2025-04-14 DIAGNOSIS — M54.2 NECK PAIN: Primary | ICD-10-CM

## 2025-04-21 ENCOUNTER — OFFICE VISIT (OUTPATIENT)
Dept: GASTROENTEROLOGY | Facility: CLINIC | Age: 75
End: 2025-04-21
Payer: MEDICARE

## 2025-04-21 VITALS — SYSTOLIC BLOOD PRESSURE: 145 MMHG | DIASTOLIC BLOOD PRESSURE: 75 MMHG | HEART RATE: 69 BPM | TEMPERATURE: 96.5 F

## 2025-04-21 DIAGNOSIS — K59.00 CONSTIPATION, UNSPECIFIED CONSTIPATION TYPE: ICD-10-CM

## 2025-04-21 DIAGNOSIS — R19.4 CHANGE IN BOWEL HABITS: Primary | ICD-10-CM

## 2025-04-21 PROCEDURE — 1160F RVW MEDS BY RX/DR IN RCRD: CPT | Performed by: NURSE PRACTITIONER

## 2025-04-21 PROCEDURE — 1126F AMNT PAIN NOTED NONE PRSNT: CPT | Performed by: NURSE PRACTITIONER

## 2025-04-21 PROCEDURE — 1157F ADVNC CARE PLAN IN RCRD: CPT | Performed by: NURSE PRACTITIONER

## 2025-04-21 PROCEDURE — 3078F DIAST BP <80 MM HG: CPT | Performed by: NURSE PRACTITIONER

## 2025-04-21 PROCEDURE — 99214 OFFICE O/P EST MOD 30 MIN: CPT | Performed by: NURSE PRACTITIONER

## 2025-04-21 PROCEDURE — 99204 OFFICE O/P NEW MOD 45 MIN: CPT | Performed by: NURSE PRACTITIONER

## 2025-04-21 PROCEDURE — 1159F MED LIST DOCD IN RCRD: CPT | Performed by: NURSE PRACTITIONER

## 2025-04-21 PROCEDURE — 3077F SYST BP >= 140 MM HG: CPT | Performed by: NURSE PRACTITIONER

## 2025-04-21 ASSESSMENT — ENCOUNTER SYMPTOMS
PSYCHIATRIC NEGATIVE: 1
WHEEZING: 0
DEPRESSION: 0
EYES NEGATIVE: 1
STRIDOR: 0
RESPIRATORY NEGATIVE: 1
ENDOCRINE NEGATIVE: 1
FEVER: 0
HEMATOLOGIC/LYMPHATIC NEGATIVE: 1
SHORTNESS OF BREATH: 0
CHEST TIGHTNESS: 0
FATIGUE: 0
MUSCULOSKELETAL NEGATIVE: 1
CARDIOVASCULAR NEGATIVE: 1
CHILLS: 0
DIFFICULTY URINATING: 0
APNEA: 0
ROS GI COMMENTS: SEE HPI
NEUROLOGICAL NEGATIVE: 1
ALLERGIC/IMMUNOLOGIC NEGATIVE: 1
DIAPHORESIS: 0
COUGH: 0

## 2025-04-21 ASSESSMENT — PAIN SCALES - GENERAL: PAINLEVEL_OUTOF10: 0-NO PAIN

## 2025-04-21 NOTE — PROGRESS NOTES
"Subjective   Patient ID: Evelyne Coley \"Apollo" is a 74 y.o. female who presents for Constipation. PMH: hypothyroidism, HTN, arthritis    Denies pacer/defib, sleep apnea, diabetes, blood thinners     Had new constipation, started for 3 days, never had constipation before  She was impacted     She denies anything new at that time, some stress     Never had constipation before, no  blood in the stools, abdominal pain, vomiting, weight loss, night sweats     No new medications, thinks maybe she is not drinking enough water    Constipation has resolved    CT (2/2025)-large amount of stool in the rectum, liquid stool throughout the proximal colon without wall thickening    Colonoscopy (10/2020)-3 mm polyp, repeat in 5 years ([facility])      Family Hx: Denies a family hx of CRC, GI cancers     Review of Systems   Constitutional:  Negative for chills, diaphoresis, fatigue and fever.   HENT: Negative.     Eyes: Negative.    Respiratory: Negative.  Negative for apnea, cough, chest tightness, shortness of breath, wheezing and stridor.    Cardiovascular: Negative.    Gastrointestinal:         See HPI    Endocrine: Negative.    Genitourinary: Negative.  Negative for difficulty urinating.   Musculoskeletal: Negative.    Skin: Negative.    Allergic/Immunologic: Negative.    Neurological: Negative.    Hematological: Negative.    Psychiatric/Behavioral: Negative.         Objective   Physical Exam  Constitutional:       Appearance: Normal appearance.   HENT:      Nose: Nose normal.   Eyes:      General: Lids are normal.   Cardiovascular:      Rate and Rhythm: Normal rate and regular rhythm.      Heart sounds: Normal heart sounds.   Pulmonary:      Effort: Pulmonary effort is normal.      Breath sounds: Normal breath sounds.   Abdominal:      General: Bowel sounds are normal.   Musculoskeletal:         General: Normal range of motion.   Skin:     General: Skin is warm and dry.   Neurological:      Mental Status: She is alert and oriented " to person, place, and time.   Psychiatric:         Mood and Affect: Mood normal.         Assessment/Plan   Diagnoses and all orders for this visit:  Change in bowel habits  -     Colonoscopy Screening; Average Risk Patient; Future  Constipation, unspecified constipation type  -     Colonoscopy Screening; Average Risk Patient; Future     75 yo female with a PMH of hypothyroidism, HTN, arthritis who presents today for new constipation, fecal impaction x 3 days which resolved in an ER visit. Confirmed by CT. This was a new change. She is due for a colonoscopy this year, follow-up in GI PRN.     GER Gayle 04/21/25 11:10 AM

## 2025-04-21 NOTE — PATIENT INSTRUCTIONS
Continue water intake,  Make sure you get plenty of fiber in your diet  If you become constipated again please start Miralax 1-2 times/day   Start stool softener         Constipation refers to a change in bowel habits, but it has varied meanings. Stools may be too hard or too small, difficult to pass, or infrequent (less than three times per week). People with constipation may also notice a frequent need to strain and a sense that the bowels are not empty.    Your bowels like consistency and routine.     Behavior changes -- The bowels are most active following meals, and this is often the time when stools will pass most readily. If you ignore your body's signals to have a bowel movement, the signals become weaker and weaker over time.    By paying close attention to these signals, you may have an easier time moving your bowels. Drinking a caffeine-containing beverage in the morning may also be helpful.    Increase fiber -- Increasing fiber in your diet may reduce or eliminate constipation. The recommended amount of dietary fiber is 20 to 35 grams of fiber per day. Examples of high fiber foods include pears, spinach, apples, raspberries.     Fiber side effects -- Consuming large amounts of fiber can cause abdominal bloating or gas; this can be minimized by starting with a small amount and slowly increasing until stools become softer and more frequent.    More recently, kiwi fruit has been identified as helping with constipation. It is recommended to eat two daily.    Whatever you decide to use, please try daily for 2 weeks to notice improvement.     If you continue to have constipation despite trying these methods, please schedule a follow-up appointment.

## 2025-05-12 ENCOUNTER — APPOINTMENT (OUTPATIENT)
Dept: GASTROENTEROLOGY | Facility: CLINIC | Age: 75
End: 2025-05-12
Payer: MEDICARE

## 2025-05-20 LAB
ALBUMIN SERPL-MCNC: 4.6 G/DL (ref 3.6–5.1)
ALP SERPL-CCNC: 86 U/L (ref 37–153)
ALT SERPL-CCNC: 16 U/L (ref 6–29)
ANION GAP SERPL CALCULATED.4IONS-SCNC: 9 MMOL/L (CALC) (ref 7–17)
AST SERPL-CCNC: 16 U/L (ref 10–35)
BILIRUB SERPL-MCNC: 0.5 MG/DL (ref 0.2–1.2)
BUN SERPL-MCNC: 13 MG/DL (ref 7–25)
CALCIUM SERPL-MCNC: 10 MG/DL (ref 8.6–10.4)
CHLORIDE SERPL-SCNC: 106 MMOL/L (ref 98–110)
CHOLEST SERPL-MCNC: 252 MG/DL
CHOLEST/HDLC SERPL: 4.6 (CALC)
CO2 SERPL-SCNC: 24 MMOL/L (ref 20–32)
CREAT SERPL-MCNC: 0.81 MG/DL (ref 0.6–1)
EGFRCR SERPLBLD CKD-EPI 2021: 76 ML/MIN/1.73M2
ERYTHROCYTE [DISTWIDTH] IN BLOOD BY AUTOMATED COUNT: 14.1 % (ref 11–15)
GLUCOSE SERPL-MCNC: 97 MG/DL (ref 65–99)
HCT VFR BLD AUTO: 43.7 % (ref 35–45)
HDLC SERPL-MCNC: 55 MG/DL
HGB BLD-MCNC: 14.3 G/DL (ref 11.7–15.5)
LDLC SERPL CALC-MCNC: 144 MG/DL (CALC)
MCH RBC QN AUTO: 31.9 PG (ref 27–33)
MCHC RBC AUTO-ENTMCNC: 32.7 G/DL (ref 32–36)
MCV RBC AUTO: 97.5 FL (ref 80–100)
NONHDLC SERPL-MCNC: 197 MG/DL (CALC)
PLATELET # BLD AUTO: 304 THOUSAND/UL (ref 140–400)
PMV BLD REES-ECKER: 9.8 FL (ref 7.5–12.5)
POTASSIUM SERPL-SCNC: 4.3 MMOL/L (ref 3.5–5.3)
PROT SERPL-MCNC: 7.6 G/DL (ref 6.1–8.1)
RBC # BLD AUTO: 4.48 MILLION/UL (ref 3.8–5.1)
SODIUM SERPL-SCNC: 139 MMOL/L (ref 135–146)
TRIGL SERPL-MCNC: 342 MG/DL
TSH SERPL-ACNC: 0.55 MIU/L (ref 0.4–4.5)
WBC # BLD AUTO: 5.8 THOUSAND/UL (ref 3.8–10.8)

## 2025-05-20 NOTE — RESULT ENCOUNTER NOTE
Cristhian Stubbs- I am covering for Dr Johnston   The cholesterol is still elevated and can discuss with Dr Johnston at follow up  Rest of the labs are good range

## 2025-05-27 ENCOUNTER — EVALUATION (OUTPATIENT)
Dept: PHYSICAL THERAPY | Facility: CLINIC | Age: 75
End: 2025-05-27
Payer: MEDICARE

## 2025-05-27 DIAGNOSIS — M54.2 NECK PAIN: Primary | ICD-10-CM

## 2025-05-27 PROCEDURE — 97110 THERAPEUTIC EXERCISES: CPT | Mod: GP | Performed by: PHYSICAL THERAPIST

## 2025-05-27 PROCEDURE — 97161 PT EVAL LOW COMPLEX 20 MIN: CPT | Mod: GP | Performed by: PHYSICAL THERAPIST

## 2025-05-27 ASSESSMENT — PAIN - FUNCTIONAL ASSESSMENT: PAIN_FUNCTIONAL_ASSESSMENT: 0-10

## 2025-05-27 ASSESSMENT — PAIN DESCRIPTION - DESCRIPTORS: DESCRIPTORS: TIGHTNESS

## 2025-05-27 ASSESSMENT — PAIN SCALES - GENERAL: PAINLEVEL_OUTOF10: 0 - NO PAIN

## 2025-05-27 NOTE — PROGRESS NOTES
"Physical Therapy Evaluation and Treatment      Patient Name: Evelyne Coley \"Marian\"  MRN: [id number]  Today's Date: 5/27/2025    Time Entry:   Time Calculation  Start Time: 1522  Stop Time: 1606  Time Calculation (min): 44 min  PT Evaluation Time Entry  PT Evaluation (Low) Time Entry: 20  PT Therapeutic Procedures Time Entry  Therapeutic Exercise Time Entry: 20                   Insurance:  Visit:  1 of [place]  Auth required: No  Payor: [employer]ICARE / Plan: [employer] GALINDO MEDICARE / Product Type: *No Product type* /    Certification Dates:  5/27/25 - 8/25/25    Assessment:  PT Assessment  PT Assessment Results: Decreased strength, Decreased range of motion, Decreased mobility, Pain  Rehab Prognosis: Good  Evaluation/Treatment Tolerance: Patient tolerated treatment well   Patient presents to physical therapy with c/o R sided neck pain.  States she started having insidious onset of neck pain along R side about a year ago.  Participated in therapy earlier this year with some progress.  PCP ordered CT in April to further examine neck, which was unremarkable.  Wanted patient to get back in with therapy which is why she is here today.  Denies N/T and radicular sx.  Exhibits decreased ROM, FHRS posture, decreased periscapular strength, (+) impingement in R shoulder, TTP R UT/LS.  S/s consistent with neck pain vs shoulder impingement.  Will continue to assess.    Plan:  OP PT Plan  Treatment/Interventions: Cryotherapy, Dry needling, Education/ Instruction, Electrical stimulation, Hot pack, Manual therapy, Neuromuscular re-education, Self care/ home management, Taping techniques, Therapeutic activities, Therapeutic exercises  PT Plan: Skilled PT  PT Frequency: 1 time per week  Duration: 12wks  Onset Date: 05/27/24  Certification Period Start Date: 05/27/25  Certification Period End Date: 08/25/25  Rehab Potential: Good  Plan of Care Agreement: Patient    Current Problem:   1. Neck pain  Follow Up In Physical Therapy    "       Subjective    Patient presents to physical therapy for evaluation of Cx.  States she started having insidious onset of neck pain along R side about a year ago.  Participated in therapy earlier this year with some progress.  PCP ordered CT in April to further examine neck, which was unremarkable.  Wanted patient to get back in with therapy which is why she is here today.  Denies N/T and radicular sx.  C/o R sided neck pain.    PREVIOUS INTERVENTION:  PT    EXACERBATING FACTORS:  Looking over R shoulder  Looking down    RELIEVING FACTORS:  Medications (meloxicam, tylenol)    OCCUPATION:  Retired    HOBBIES:  Gardening  House work    HOME SETUP:  Single story setup  Lives with     BARRIERS IMPACTING CARE:  Chronicity of issue    General:  General  Reason for Referral: Neck pain  Referred By: Dr. Johnston  Precautions:  Precautions  STEADI Fall Risk Score (The score of 4 or more indicates an increased risk of falling): 0  Medical Precautions: No known precautions/limitation (Medical hx reviewed; not likely to impact care.)  Pain:  Pain Assessment  Pain Assessment: 0-10  0-10 (Numeric) Pain Score: 0 - No pain (5/10 at worst)  Pain Location: Neck  Pain Orientation: Right  Pain Descriptors: Tightness  Pain Frequency: Intermittent  Prior Level of Function:  Prior Function Per Pt/Caregiver Report  Level of Porter: Independent with ADLs and functional transfers  Hand Dominance: Right    Objective   OBSERVATION  FHRS posture    AROM  Cx flexion 39  Cx extension 25  Cx R rotation 48 pain  Cx L rotation 58    R shoulder WFL - pain at end range flexion and ABD  L shoulder WFL    STRENGTH  R shoulder flexion 5/5  R shoulder ABD 5/5  R shoulder ER 5/5  R shoulder IR 5/5  R periscapulars 4/5    L shoulder flexion 5/5  L shoulder ABD 5/5  L shoulder ER 5/5  L shoulder IR 5/5  L periscapulars 4/5    PALPATION  TTP R LS, UT    SPECIAL TESTS  (+) Maribel Barnes on R    Outcome Measures:  Other Measures  Neck  Disability Index: 9     TREATMENT  THERAPEUTIC EXERCISE:  Access Code: [email or web address]  URL: [email or web address]  Date: 05/27/2025  Prepared by: Kat Rocha    Exercises  - Supine Chin Tuck  - 2 x daily - 7 x weekly - 1 sets - 5 reps - 5 hold  - Supine Shoulder Horizontal Abduction with Resistance  - 2 x daily - 7 x weekly - 1 sets - 10 reps  - Shoulder External Rotation and Scapular Retraction with Resistance  - 2 x daily - 7 x weekly - 1 sets - 10 reps  - Standing Shoulder Row with Anchored Resistance  - 2 x daily - 7 x weekly - 1 sets - 10 reps  - Standing Cervical Rotation AROM with Overpressure  - 2 x daily - 7 x weekly - 1 sets - 5 reps - 10 hold    EDUCATION:   Patient education on diagnosis/prognosis, pathophysiology, POC, and HEP.  Patient demonstrates understanding of HEP/POC.     Goals:  1. Pain 0/10  2. Outcomes Measure:  NDI 2/50  3. Cx R rotation > 55 deg to allow patient to look over shoulder without increased sx.  4. Demonstrates independence with HEP.

## 2025-05-28 ENCOUNTER — APPOINTMENT (OUTPATIENT)
Dept: PRIMARY CARE | Facility: CLINIC | Age: 75
End: 2025-05-28
Payer: MEDICARE

## 2025-05-28 VITALS
WEIGHT: 164.4 LBS | BODY MASS INDEX: 27.39 KG/M2 | DIASTOLIC BLOOD PRESSURE: 84 MMHG | SYSTOLIC BLOOD PRESSURE: 138 MMHG | HEIGHT: 65 IN | OXYGEN SATURATION: 94 % | HEART RATE: 99 BPM | TEMPERATURE: 97.3 F

## 2025-05-28 DIAGNOSIS — Z12.31 ENCOUNTER FOR SCREENING MAMMOGRAM FOR BREAST CANCER: ICD-10-CM

## 2025-05-28 DIAGNOSIS — Z00.00 ROUTINE GENERAL MEDICAL EXAMINATION AT HEALTH CARE FACILITY: Primary | ICD-10-CM

## 2025-05-28 DIAGNOSIS — M54.2 NECK PAIN: ICD-10-CM

## 2025-05-28 DIAGNOSIS — Z78.0 ASYMPTOMATIC MENOPAUSAL STATE: ICD-10-CM

## 2025-05-28 DIAGNOSIS — I10 ESSENTIAL HYPERTENSION: ICD-10-CM

## 2025-05-28 RX ORDER — LISINOPRIL 20 MG/1
20 TABLET ORAL DAILY
Qty: 90 TABLET | Refills: 3 | Status: SHIPPED | OUTPATIENT
Start: 2025-05-28 | End: 2026-05-23

## 2025-05-28 ASSESSMENT — ENCOUNTER SYMPTOMS
VOICE CHANGE: 0
COUGH: 0
DIZZINESS: 0
WHEEZING: 0
HEMATURIA: 0
UNEXPECTED WEIGHT CHANGE: 0
HEADACHES: 0
TREMORS: 0
FATIGUE: 0
DIARRHEA: 0
LIGHT-HEADEDNESS: 0
DIFFICULTY URINATING: 0
CONSTIPATION: 0
ACTIVITY CHANGE: 0
PALPITATIONS: 0
APPETITE CHANGE: 0
NAUSEA: 0
BLOOD IN STOOL: 0
TROUBLE SWALLOWING: 0
VOMITING: 0
CHEST TIGHTNESS: 0
ARTHRALGIAS: 0

## 2025-05-28 ASSESSMENT — ACTIVITIES OF DAILY LIVING (ADL)
BATHING: INDEPENDENT
MANAGING_FINANCES: INDEPENDENT
DRESSING: INDEPENDENT
GROCERY_SHOPPING: INDEPENDENT
TAKING_MEDICATION: INDEPENDENT
DOING_HOUSEWORK: INDEPENDENT

## 2025-05-28 ASSESSMENT — PATIENT HEALTH QUESTIONNAIRE - PHQ9
SUM OF ALL RESPONSES TO PHQ9 QUESTIONS 1 AND 2: 0
2. FEELING DOWN, DEPRESSED OR HOPELESS: NOT AT ALL
1. LITTLE INTEREST OR PLEASURE IN DOING THINGS: NOT AT ALL

## 2025-05-28 NOTE — PROGRESS NOTES
Subjective   Reason for Visit: Evelyne Coley is an 74 y.o. female here for a Medicare Wellness visit.     Past Medical, Surgical, and Family History reviewed and updated in chart.    Reviewed all medications by prescribing practitioner or clinical pharmacist (such as prescriptions, OTCs, herbal therapies and supplements) and documented in the medical record.    History of Present Illness  The patient presents for evaluation of neck pain and health maintenance.    Neck Pain  - She has been experiencing neck pain, which is exacerbated when she lies on the affected side, often disrupting her sleep.  - She has been managing the pain with Tylenol and meloxicam, alternating between the two.  - She recently consulted a physical therapist, who recommended a specialist for further evaluation of her neck condition.  - The therapist also suggested the possibility of arthritis.  - She has been incorporating neck exercises into her routine and attempting to increase her physical activity through walking, although inclement weather has limited her outdoor activities.    Supplemental information: She reports no issues with vision or swallowing. She has been identified as a potential candidate for hearing aids at [facility]. She is not experiencing any chest pain, shortness of breath, or difficulties with bowel movements or urination. She is due for a mammogram, bone density test, and colonoscopy. Her last colonoscopy was performed at [facility].     Has ACP documents on file    History of Present Illness    Children - 2  Grandchildren - 3 (14-13-10)  Tob - Nonsmoker  Alcohol - 1-2 per week  Marijuana  - denies  Exercise -   rare    Patient Care Team:  Candice Johnston MD as PCP - General  Candice Johnston MD as PCP - [employer] Advantage PCP  Lucy Schwartz MD as Referring Physician (Rheumatology)  Theresa John MD as Referring Physician (Ophthalmology)  Mansoor Bradley MD as Referring Physician  "(Sports Medicine)     Review of Systems   Constitutional:  Negative for activity change, appetite change, fatigue and unexpected weight change.   HENT:  Negative for ear pain, hearing loss, tinnitus, trouble swallowing and voice change.    Eyes:  Negative for visual disturbance.   Respiratory:  Negative for cough, chest tightness and wheezing.    Cardiovascular:  Negative for chest pain, palpitations and leg swelling.   Gastrointestinal:  Negative for blood in stool, constipation, diarrhea, nausea and vomiting.   Genitourinary:  Negative for difficulty urinating, hematuria and vaginal bleeding.   Musculoskeletal:  Negative for arthralgias.   Skin:  Negative for rash.   Neurological:  Negative for dizziness, tremors, syncope, light-headedness and headaches.       Objective   Vitals:  Visit Vitals  /84   Pulse 99   Temp 36.3 °C (97.3 °F)   Ht 1.651 m (5' 5\")   Wt 74.6 kg (164 lb 6.4 oz)   SpO2 94%   BMI 27.36 kg/m²   Smoking Status Never   BSA 1.85 m²        Physical Exam  Constitutional:       General: She is not in acute distress.     Appearance: She is well-developed. She is not diaphoretic.   HENT:      Head: Normocephalic.      Right Ear: Tympanic membrane normal. There is no impacted cerumen.      Left Ear: Tympanic membrane normal. There is no impacted cerumen.      Nose: Nose normal.      Mouth/Throat:      Mouth: Mucous membranes are moist.      Pharynx: Oropharynx is clear. No oropharyngeal exudate or posterior oropharyngeal erythema.   Eyes:      General: No scleral icterus.     Extraocular Movements: Extraocular movements intact.      Conjunctiva/sclera: Conjunctivae normal.      Pupils: Pupils are equal, round, and reactive to light.   Neck:      Thyroid: No thyromegaly.      Vascular: No JVD.   Cardiovascular:      Rate and Rhythm: Normal rate and regular rhythm.      Pulses: Normal pulses.      Heart sounds: Normal heart sounds. No murmur heard.     No friction rub. No gallop.   Pulmonary:      " Effort: Pulmonary effort is normal. No respiratory distress.      Breath sounds: Normal breath sounds. No wheezing or rales.   Chest:      Chest wall: No tenderness.   Abdominal:      General: Bowel sounds are normal. There is no distension.      Palpations: Abdomen is soft. There is no mass.      Tenderness: There is no abdominal tenderness. There is no rebound.   Musculoskeletal:         General: Normal range of motion.      Cervical back: Normal range of motion and neck supple.   Lymphadenopathy:      Cervical: No cervical adenopathy.   Skin:     General: Skin is warm and dry.   Neurological:      General: No focal deficit present.      Mental Status: She is alert and oriented to person, place, and time.      Deep Tendon Reflexes: Reflexes normal.   Psychiatric:         Mood and Affect: Mood normal.         Thought Content: Thought content normal.       Assessment & Plan  1. Neck pain  - Continue current regimen of meloxicam and Tylenol (one dose in the morning and another at night as needed)  - Persist with physical therapy sessions  - Referral to a neck specialist to be arranged by the   - May consider canceling specialist appointment if pain subsides prior    2.hypertension  Blood pressure normal today continue lisinopril    3. Health maintenance  - Mammogram and bone density test to be ordered  - Due for colonoscopy in 10/2025; advised to schedule at [facility]    - Follow-up recommended in 4 months     1. Routine general medical examination at health care facility  1 [facility] Up In [facility] - PCP - Wellness Exam    1 Year Follow Up In [facility] - PCP - Wellness Exam      2. Essential hypertension  lisinopril 20 mg tablet      3. Neck pain  Referral to Physical Medicine Rehab      4. Asymptomatic menopausal state  XR DEXA bone density      5. Encounter for screening mammogram for breast cancer  BI mammo bilateral screening tomosynthesis          Results               Candice Johnston MD   This medical note was created with the assistance of artificial intelligence (AI) for documentation purposes. The content has been reviewed and confirmed by the healthcare provider for accuracy and completeness. Patient consented to the use of audio recording and use of AI during their visit.

## 2025-06-03 ENCOUNTER — TREATMENT (OUTPATIENT)
Dept: PHYSICAL THERAPY | Facility: CLINIC | Age: 75
End: 2025-06-03
Payer: MEDICARE

## 2025-06-03 DIAGNOSIS — M54.2 NECK PAIN: ICD-10-CM

## 2025-06-03 PROCEDURE — 97110 THERAPEUTIC EXERCISES: CPT | Mod: GP | Performed by: PHYSICAL THERAPIST

## 2025-06-03 PROCEDURE — 97140 MANUAL THERAPY 1/> REGIONS: CPT | Mod: GP | Performed by: PHYSICAL THERAPIST

## 2025-06-03 ASSESSMENT — PAIN - FUNCTIONAL ASSESSMENT: PAIN_FUNCTIONAL_ASSESSMENT: 0-10

## 2025-06-03 ASSESSMENT — PAIN SCALES - GENERAL: PAINLEVEL_OUTOF10: 5 - MODERATE PAIN

## 2025-06-03 NOTE — PROGRESS NOTES
Physical Therapy Treatment    Patient Name: Evelyne Coley  MRN: [id number]  Today's Date: 6/3/2025    Time Entry:   Time Calculation  Start Time: 1235  Stop Time: 1315  Time Calculation (min): 40 min     PT Therapeutic Procedures Time Entry  Manual Therapy Time Entry: 25  Therapeutic Exercise Time Entry: 15                   Insurance:  Visit:  2 of [place]  Auth required: No  Payor: CINDYTJOSE MEDICARE / Plan: AETJOSE GALINDO MEDICARE / Product Type: *No Product type* /    Certification Dates:  5/27/25 - 8/25/25     Assessment:   Decreased sx post MT and increased ROM.    Plan:   Follow up in 2wks when she returns from vacation.  Continue with ROM and MFR.    Current Problem  1. Neck pain  Follow Up In Physical Therapy          General  General  Reason for Referral: Neck pain  Referred By: Dr. Johnston    Subjective    Band exercises were starting to bother the shoulder but neck stretches have been fine.    Precautions  Precautions  Medical Precautions: No known precautions/limitation (Medical hx reviewed; not likely to impact care.)  Pain  Pain Assessment  Pain Assessment: 0-10  0-10 (Numeric) Pain Score: 5 - Moderate pain  Pain Location: Neck  Pain Orientation: Right  Pain Radiating Towards: R shoulder    Objective   AROM  R Cx rotation 48 // 58  L Cx rotation 58 // 68    TREATMENT  THERAPEUTIC EXERCISE:  Review HEP  RTB Sup H ABD 10x  RTB B ER with scap retrac 10x  RTB rows 10x  RTB B Extn 10x    MANUAL THERAPY:  MFR R UT  Cx distraction    NEUROMUSCULAR RE-EDUCATION:      THERAPEUTIC ACTIVITY:      MODALITIES:      OP EDUCATION:   Access Code: [email or web address]  URL: [email or web address]  Date: 06/03/2025  Prepared by: Kat Rocha    Exercises  - Supine Chin Tuck  - 2 x daily - 7 x weekly - 1 sets - 5 reps - 5 hold  - Supine Shoulder Horizontal Abduction with Resistance  - 2 x daily - 7 x weekly - 1 sets - 10 reps  - Shoulder External Rotation and Scapular Retraction with Resistance  - 2 x daily - 7 x  weekly - 1 sets - 10 reps  - Standing Shoulder Row with Anchored Resistance  - 2 x daily - 7 x weekly - 1 sets - 10 reps  - Standing Cervical Rotation AROM with Overpressure  - 2 x daily - 7 x weekly - 1 sets - 5 reps - 10 hold  - Shoulder extension with resistance - Neutral  - 2 x daily - 7 x weekly - 1 sets - 10 reps    Goals:  1. Pain 0/10  2. Outcomes Measure:  NDI 2/50  3. Cx R rotation > 55 deg to allow patient to look over shoulder without increased sx.  4. Demonstrates independence with HEP.

## 2025-06-17 ENCOUNTER — TREATMENT (OUTPATIENT)
Dept: PHYSICAL THERAPY | Facility: CLINIC | Age: 75
End: 2025-06-17
Payer: MEDICARE

## 2025-06-17 DIAGNOSIS — M54.2 NECK PAIN: ICD-10-CM

## 2025-06-17 PROCEDURE — 97140 MANUAL THERAPY 1/> REGIONS: CPT | Mod: GP | Performed by: PHYSICAL THERAPIST

## 2025-06-17 ASSESSMENT — PAIN SCALES - GENERAL: PAINLEVEL_OUTOF10: 6

## 2025-06-17 ASSESSMENT — PAIN - FUNCTIONAL ASSESSMENT: PAIN_FUNCTIONAL_ASSESSMENT: 0-10

## 2025-06-17 NOTE — PROGRESS NOTES
Physical Therapy Treatment    Patient Name: Evelyne Coley  MRN: [id number]  Today's Date: 6/17/2025    Time Entry:   Time Calculation  Start Time: 1518  Stop Time: 1600  Time Calculation (min): 42 min     PT Therapeutic Procedures Time Entry  Manual Therapy Time Entry: 38                   Insurance:  Visit:  3 of JASWANT Harmon required: No  Payor: [employer]ICARE / Plan: AEFORD GALINDO MEDICARE / Product Type: *No Product type* /    Certification Dates:  5/27/25 - 8/25/25     Assessment:   Increased ROM at start of session.  Decreased sx post MT.    Plan:   Continue with strengthening and ROM as tolerated.    Current Problem  1. Neck pain  Follow Up In Physical Therapy            General  General  Reason for Referral: Neck pain  Referred By: Dr. Johnston    Subjective    Was in [place] last week.  Unable to do much with exercises but tolerated trip fine.  Neck feels really tight now.    Precautions  Precautions  Medical Precautions: No known precautions/limitation (Medical hx reviewed; not likely to impact care.)  Pain  Pain Assessment  Pain Assessment: 0-10  0-10 (Numeric) Pain Score: 6  Pain Location: Neck  Pain Orientation: Right    Objective   AROM  R Cx rotation 53  L Cx rotation 64    TREATMENT  THERAPEUTIC EXERCISE:      MANUAL THERAPY:  MFR R UT  Cx distraction    NEUROMUSCULAR RE-EDUCATION:      THERAPEUTIC ACTIVITY:      MODALITIES:      OP EDUCATION:   Access Code: [email or web address]  URL: [email or web address]  Date: 06/03/2025  Prepared by: Kat Rocha    Exercises  - Supine Chin Tuck  - 2 x daily - 7 x weekly - 1 sets - 5 reps - 5 hold  - Supine Shoulder Horizontal Abduction with Resistance  - 2 x daily - 7 x weekly - 1 sets - 10 reps  - Shoulder External Rotation and Scapular Retraction with Resistance  - 2 x daily - 7 x weekly - 1 sets - 10 reps  - Standing Shoulder Row with Anchored Resistance  - 2 x daily - 7 x weekly - 1 sets - 10 reps  - Standing Cervical Rotation AROM with Overpressure   - 2 x daily - 7 x weekly - 1 sets - 5 reps - 10 hold  - Shoulder extension with resistance - Neutral  - 2 x daily - 7 x weekly - 1 sets - 10 reps    Goals:  1. Pain 0/10  2. Outcomes Measure:  NDI 2/50  3. Cx R rotation > 55 deg to allow patient to look over shoulder without increased sx.  4. Demonstrates independence with HEP.

## 2025-06-27 ENCOUNTER — TREATMENT (OUTPATIENT)
Dept: PHYSICAL THERAPY | Facility: CLINIC | Age: 75
End: 2025-06-27
Payer: MEDICARE

## 2025-06-27 DIAGNOSIS — M54.2 NECK PAIN: Primary | ICD-10-CM

## 2025-06-27 PROCEDURE — 97535 SELF CARE MNGMENT TRAINING: CPT | Mod: GP | Performed by: PHYSICAL THERAPIST

## 2025-06-27 PROCEDURE — 97140 MANUAL THERAPY 1/> REGIONS: CPT | Mod: GP | Performed by: PHYSICAL THERAPIST

## 2025-06-27 ASSESSMENT — PAIN - FUNCTIONAL ASSESSMENT: PAIN_FUNCTIONAL_ASSESSMENT: 0-10

## 2025-06-27 ASSESSMENT — PAIN SCALES - GENERAL: PAINLEVEL_OUTOF10: 3

## 2025-06-27 NOTE — PROGRESS NOTES
Physical Therapy Treatment    Patient Name: Evelyne Coley  MRN: [id number]  Today's Date: 6/27/2025    Time Entry:   Time Calculation  Start Time: 1035  Stop Time: 1115  Time Calculation (min): 40 min     PT Therapeutic Procedures Time Entry  Manual Therapy Time Entry: 25  Self-Care/Home Mgmt Training: 15                   Insurance:  Visit:  4 of [place]  Auth required: No  Payor: [employer]SE MEDICARE / Plan: [employer] GOLDEN MEDICARE / Product Type: *No Product type* /    Certification Dates:  5/27/25 - 8/25/25     Assessment:   Increased ROM at start of session.     Plan:   Follow up in 3wks for re-check before consult with PM&R.    Current Problem  1. Neck pain              General  General  Reason for Referral: Neck pain  Referred By: Dr. Johnston    Subjective    No significant changes in sx.    Precautions  Precautions  Medical Precautions: No known precautions/limitation (Medical hx reviewed; not likely to impact care.)  Pain  Pain Assessment  Pain Assessment: 0-10  0-10 (Numeric) Pain Score: 3  Pain Location: Neck  Pain Orientation: Right    Objective   AROM  R Cx rotation 57  L Cx rotation 67    TREATMENT  THERAPEUTIC EXERCISE:      MANUAL THERAPY:  MFR R UT  Cx distraction    NEUROMUSCULAR RE-EDUCATION:      THERAPEUTIC ACTIVITY:      SELF-CARE:  Discussed likely treatment options moving forward with PM&R and POC.     OP EDUCATION:   Access Code: [email or web address]  URL: [email or web address]  Date: 06/03/2025  Prepared by: Kat Rocha    Exercises  - Supine Chin Tuck  - 2 x daily - 7 x weekly - 1 sets - 5 reps - 5 hold  - Supine Shoulder Horizontal Abduction with Resistance  - 2 x daily - 7 x weekly - 1 sets - 10 reps  - Shoulder External Rotation and Scapular Retraction with Resistance  - 2 x daily - 7 x weekly - 1 sets - 10 reps  - Standing Shoulder Row with Anchored Resistance  - 2 x daily - 7 x weekly - 1 sets - 10 reps  - Standing Cervical Rotation AROM with Overpressure  - 2 x daily - 7 x weekly -  1 sets - 5 reps - 10 hold  - Shoulder extension with resistance - Neutral  - 2 x daily - 7 x weekly - 1 sets - 10 reps    Goals:  1. Pain 0/10  2. Outcomes Measure:  NDI 2/50  3. Cx R rotation > 55 deg to allow patient to look over shoulder without increased sx.  4. Demonstrates independence with HEP.

## 2025-07-17 ENCOUNTER — TREATMENT (OUTPATIENT)
Dept: PHYSICAL THERAPY | Facility: CLINIC | Age: 75
End: 2025-07-17
Payer: MEDICARE

## 2025-07-17 DIAGNOSIS — S16.1XXA STRAIN OF NECK MUSCLE, INITIAL ENCOUNTER: ICD-10-CM

## 2025-07-17 DIAGNOSIS — M54.2 NECK PAIN: Primary | ICD-10-CM

## 2025-07-17 PROCEDURE — 97140 MANUAL THERAPY 1/> REGIONS: CPT | Mod: GP | Performed by: PHYSICAL THERAPIST

## 2025-07-17 ASSESSMENT — PAIN - FUNCTIONAL ASSESSMENT: PAIN_FUNCTIONAL_ASSESSMENT: 0-10

## 2025-07-17 ASSESSMENT — PAIN SCALES - GENERAL: PAINLEVEL_OUTOF10: 6

## 2025-07-17 NOTE — PROGRESS NOTES
Physical Therapy Treatment    Patient Name: Evelyne Coley  MRN: [id number]  Today's Date: 7/17/2025    Time Entry:   Time Calculation  Start Time: 1407  Stop Time: 1450  Time Calculation (min): 43 min     PT Therapeutic Procedures Time Entry  Manual Therapy Time Entry: 38                   Insurance:  Visit:  5 of [place]  Auth required: No  Payor: [employer] MEDICARE / Plan: MONROE GALINDO MEDICARE / Product Type: *No Product type* /    Certification Dates:  5/27/25 - 8/25/25     Assessment:   Decreased sx post MT.  No change in ROM.    Plan:   Continue with PT for sx mgmt through PM&R consult.    Current Problem  1. Neck pain        2. Strain of neck muscle, initial encounter                General  General  Reason for Referral: Neck pain  Referred By: Dr. Johnston    Subjective    States neck feels stiff and sore more so now since having to take care of  after cataract surgery.    Precautions  Precautions  Medical Precautions: No known precautions/limitation (Medical hx reviewed; not likely to impact care.)  Pain  Pain Assessment  Pain Assessment: 0-10  0-10 (Numeric) Pain Score: 6  Pain Location: Neck  Pain Orientation: Right    Objective   AROM  R Cx rotation 65  L Cx rotation 64    TREATMENT  THERAPEUTIC EXERCISE:      MANUAL THERAPY:  MFR R UT  Cx distraction    NEUROMUSCULAR RE-EDUCATION:      THERAPEUTIC ACTIVITY:      SELF-CARE:  Discussed likely treatment options moving forward with PM&R and POC.     OP EDUCATION:   Access Code: [email or web address]  URL: [email or web address]  Date: 06/03/2025  Prepared by: Kat Rocha    Exercises  - Supine Chin Tuck  - 2 x daily - 7 x weekly - 1 sets - 5 reps - 5 hold  - Supine Shoulder Horizontal Abduction with Resistance  - 2 x daily - 7 x weekly - 1 sets - 10 reps  - Shoulder External Rotation and Scapular Retraction with Resistance  - 2 x daily - 7 x weekly - 1 sets - 10 reps  - Standing Shoulder Row with Anchored Resistance  - 2 x daily - 7 x weekly - 1  sets - 10 reps  - Standing Cervical Rotation AROM with Overpressure  - 2 x daily - 7 x weekly - 1 sets - 5 reps - 10 hold  - Shoulder extension with resistance - Neutral  - 2 x daily - 7 x weekly - 1 sets - 10 reps    Goals:  1. Pain 0/10  2. Outcomes Measure:  NDI 2/50  3. Cx R rotation > 55 deg to allow patient to look over shoulder without increased sx.  4. Demonstrates independence with HEP.

## 2025-07-22 ENCOUNTER — APPOINTMENT (OUTPATIENT)
Dept: RADIOLOGY | Facility: CLINIC | Age: 75
End: 2025-07-22
Payer: MEDICARE

## 2025-07-22 DIAGNOSIS — E03.9 HYPOTHYROIDISM, UNSPECIFIED TYPE: ICD-10-CM

## 2025-07-22 RX ORDER — LEVOTHYROXINE SODIUM 100 UG/1
100 TABLET ORAL DAILY
Qty: 90 TABLET | Refills: 2 | Status: SHIPPED | OUTPATIENT
Start: 2025-07-22

## 2025-07-24 ENCOUNTER — TREATMENT (OUTPATIENT)
Dept: PHYSICAL THERAPY | Facility: CLINIC | Age: 75
End: 2025-07-24
Payer: MEDICARE

## 2025-07-24 DIAGNOSIS — M54.2 NECK PAIN: Primary | ICD-10-CM

## 2025-07-24 DIAGNOSIS — S16.1XXA STRAIN OF NECK MUSCLE, INITIAL ENCOUNTER: ICD-10-CM

## 2025-07-24 PROCEDURE — 97110 THERAPEUTIC EXERCISES: CPT | Mod: GP | Performed by: PHYSICAL THERAPIST

## 2025-07-24 PROCEDURE — 97140 MANUAL THERAPY 1/> REGIONS: CPT | Mod: GP | Performed by: PHYSICAL THERAPIST

## 2025-07-24 ASSESSMENT — PAIN SCALES - GENERAL: PAINLEVEL_OUTOF10: 3

## 2025-07-24 ASSESSMENT — PAIN - FUNCTIONAL ASSESSMENT: PAIN_FUNCTIONAL_ASSESSMENT: 0-10

## 2025-07-24 NOTE — PROGRESS NOTES
"Physical Therapy Treatment    Patient Name: Evelyne Coley  MRN: [id number]  Today's Date: 7/24/2025    Time Entry:   Time Calculation  Start Time: 1406  Stop Time: 1445  Time Calculation (min): 39 min     PT Therapeutic Procedures Time Entry  Therapeutic Exercise Time Entry: 8  Manual Therapy Time Entry: 30                   Insurance:  Visit:  6 of [place]  Auth required: No  Payor: [employer] MEDICARE / Plan: MONROE GALINDO MEDICARE / Product Type: *No Product type* /    Certification Dates:  5/27/25 - 8/25/25     Assessment:   Demonstrates good understanding of HEP.  Exhibits plateau of progress with therapy.    Plan:   Follow up after consult with PM&R to determine POC moving fwd.      Current Problem  1. Neck pain        2. Strain of neck muscle, initial encounter              General  General  Reason for Referral: Neck pain  Referred By: Dr. Johnston    Subjective    Some improvement in stiffness since last visit.     Precautions  Precautions  Medical Precautions: No known precautions/limitation (Medical hx reviewed; not likely to impact care.)  Pain  Pain Assessment  Pain Assessment: 0-10  0-10 (Numeric) Pain Score: 3  Pain Location: Neck  Pain Orientation: Right    Objective   AROM  R Cx rotation 65  L Cx rotation 65    TREATMENT  THERAPEUTIC EXERCISE:  Daley stretch 6x ea  Cx R/L rotation with OP 5\"x5 ea  R/L LS stretch 20\"x3 ea    MANUAL THERAPY:  MFR R UT  Cx distraction    NEUROMUSCULAR RE-EDUCATION:      THERAPEUTIC ACTIVITY:      SELF-CARE:      OP EDUCATION:   Access Code: [email or web address]  URL: [email or web address]  Date: 06/03/2025  Prepared by: Kat Rocha    Exercises  - Supine Chin Tuck  - 2 x daily - 7 x weekly - 1 sets - 5 reps - 5 hold  - Supine Shoulder Horizontal Abduction with Resistance  - 2 x daily - 7 x weekly - 1 sets - 10 reps  - Shoulder External Rotation and Scapular Retraction with Resistance  - 2 x daily - 7 x weekly - 1 sets - 10 reps  - Standing Shoulder Row with Anchored " Resistance  - 2 x daily - 7 x weekly - 1 sets - 10 reps  - Standing Cervical Rotation AROM with Overpressure  - 2 x daily - 7 x weekly - 1 sets - 5 reps - 10 hold  - Shoulder extension with resistance - Neutral  - 2 x daily - 7 x weekly - 1 sets - 10 reps    Goals:  1. Pain 0/10  2. Outcomes Measure:  NDI 2/50  3. Cx R rotation > 55 deg to allow patient to look over shoulder without increased sx.  4. Demonstrates independence with HEP.

## 2025-07-30 ENCOUNTER — CONSULT (OUTPATIENT)
Dept: ORTHOPEDIC SURGERY | Facility: CLINIC | Age: 75
End: 2025-07-30
Payer: MEDICARE

## 2025-07-30 ENCOUNTER — HOSPITAL ENCOUNTER (OUTPATIENT)
Dept: RADIOLOGY | Facility: CLINIC | Age: 75
Discharge: HOME | End: 2025-07-30
Payer: MEDICARE

## 2025-07-30 DIAGNOSIS — M54.2 CERVICALGIA: ICD-10-CM

## 2025-07-30 DIAGNOSIS — M47.812 CERVICAL SPONDYLOSIS: ICD-10-CM

## 2025-07-30 DIAGNOSIS — M62.838 NECK MUSCLE SPASM: ICD-10-CM

## 2025-07-30 DIAGNOSIS — M54.12 CERVICAL RADICULITIS: ICD-10-CM

## 2025-07-30 DIAGNOSIS — M47.812 CERVICAL SPONDYLOSIS: Primary | ICD-10-CM

## 2025-07-30 PROCEDURE — 72040 X-RAY EXAM NECK SPINE 2-3 VW: CPT

## 2025-07-30 PROCEDURE — 72040 X-RAY EXAM NECK SPINE 2-3 VW: CPT | Performed by: RADIOLOGY

## 2025-07-30 PROCEDURE — 99204 OFFICE O/P NEW MOD 45 MIN: CPT | Performed by: PHYSICAL MEDICINE & REHABILITATION

## 2025-07-30 PROCEDURE — 99213 OFFICE O/P EST LOW 20 MIN: CPT | Performed by: PHYSICAL MEDICINE & REHABILITATION

## 2025-07-30 RX ORDER — METHOCARBAMOL 500 MG/1
TABLET, FILM COATED ORAL
Qty: 60 TABLET | Refills: 1 | Status: SHIPPED | OUTPATIENT
Start: 2025-07-30

## 2025-07-30 SDOH — SOCIAL STABILITY: SOCIAL NETWORK: SOCIAL ACTIVITY:: 4

## 2025-07-30 NOTE — PROGRESS NOTES
New Consult/New Patient Note    7/30/2025   Candice Johnston MD    Assessment: Very pleasant 74-year-old female with chronic bilateral cervical pain.  Symptoms are severe and impact her daily activity.  Some rating symptoms in the bilateral shoulders.  - Cervical spondylosis with noted moderate to severe facet arthropathy and spondylosis-nearly fused at C5-6  - Significant component of myofascial pain and cervicalgia-pain is mostly reproduced with rotation to the contralateral side-did have a discussion that she may benefit from Botox injections    PLAN:  1)  Imaging/Diagnostic Studies: Reviewed and interpreted most recent cervical CT scan.  Will obtain upright standing films as well as MRI given her persistent pain and symptoms despite well over 6 weeks of conservative treatment.  Blood work reviewed with no significant renal impairment  2)  Therapy/Rehabilitation: Completed over 6 weeks and continues her home exercise program  3)  Pharmacological Management: Will trial Robaxin as needed-advised to cease use of Flexeril.  Educated over-the-counter NSAID and Tylenol usage.  4)  Spine/Surgical Interventions: May benefit from cervical medial branch blocks versus ANGELY  5)  Alternative Treatments: May consider alternative treatment options in the future including manipulation (chiropractor versus osteopathic) and/or acupuncture if patient does not obtain optimal relief with initial treatment plan.  6)  Consultations: Neurology for consideration of Botox  7)  Follow -up: 4-6 weeks or PRN if symptoms worsen/do not improve.   8)  Future treatment considerations: Pending cervical MRI, may benefit from cervical medial branch and or ANGELY.  May benefit from Botox injections as a lot of her pain does seem myofascial with possible early torticollis    Patient advised of the difference between hurt and harm and advised to continue with all normal activities and exercises. Patient verbalized understanding of the above plan and was  "happy with the care provided.      The above clinical summary has been dictated with voice recognition software. It has not been proofread for grammatical errors, typographical mistakes, or other semantic inconsistencies.    Thank you for visiting our office today. It was our pleasure to take part in your healthcare.     Do not hesitate to call with any questions regarding your plan of care after leaving at [phone]    To clinicians, thank you very much for this kind referral. It is a privilege to partner with you in the care of your patients. My office would be delighted to assist you with any further consultations or with questions regarding the plan of care outlined. Do not hesitate to call the office or contact me directly.     Sincerely,    DARIELA Price MD  , Physical Medicine and Rehabilitation, Orthopedic Spine  [facility] [employer]  [facility]         Evelyne Colye \"Marian\" is a 74 y.o. female who presents with neck pain and stiffness   Location: neck, bilateral and equal  Radiation: to shoulders at times bilaterally  Quality: achy, tight and sore  current4 /10,  at its worst 7/10  Exacerbated by cervical rotation  Relieved by Mobic, Tylenol, hot shower  Onset, traumatic event: About a year ago, denies  Has tried: PT, Tylenol ES, Mobic, heat       Valsalva sign is negative   Grocery cart sign is negative   Smoker:  no   Does wake them at night-sometimes if neck is turned the wrong way  Litigation: no     Patient denies bowel/bladder incontinence, denies fever, denies unintentional weight loss, denies clumsiness of hands, feet, or dropping things.  Denies any constitutional or myelopathic symptomatology.      PREVIOUS TREATMENTS  IN THE LAST SIX MONTHS     Active conservative therapy  in the last six months (see below)              1. Physical therapy:  yes                                                                    "                2. Home exercise program after PT: yes                                                     3. A physician supervised home exercise program (HEP):  yes               4. Chiropractic Care:                                                                      Passive conservative therapy  in the last six months (see below)              1. NSAIDS:  Meloxicam                                                                                                         2. Prescription pain medication: flexeril                                                             3. Acupuncture:                                                                                             4. Tens unit:      Assistive Devices: none    Work status: Retired      ROS: Other than listed in HPI, PMHX below, and intake paperwork including a 30 point patient-recorded review of symptoms which was personally reviewed and inclusive of no history of unintentional weight loss, change in appetite, significant malaise, fevers, chills, or change in bowel/bladder, shortness of breath, or chest pain.    I have confirmed and edited as necessary Past Medical, Past Surgical, Family, Social History and ROS as obtained by others. These were also obtained on new patient forms.      PHYSICAL EXAM:   GENERAL APPEARANCE:  Well nourished, well developed, and no apparent distress.  NEURO PSYCH: Patient oriented to person, place, Mood pleasant. Benign affect.  MUSCULOSKELETAL and NEUROLOGICAL       VISUAL INSPECTION          CERVICAL: WNL  SPINE ROM:   CERVICAL ROM: Limited somewhat diffusely, rotation more than flexion and extension      PALPATION:           SPINOUS PROCESS: Nontender midline           PARASPINALS: Has bilateral cervical  FACET LOADING: Negative bilateral cervical, pain is reproduced more with contralateral rotation  MUSCLE BULK: Normal and symmetrical in the upper & lower extremities.  MUSCLE TONE: Normal  MOTOR: 5/5 in all muscle groups  tested in bilateral upper extremities   SENSORY: Normal sensory exam to light touch in the upper extremities  GAIT: Normal.  No sig. balance deficit appreciated  PERIPHERAL JOINT ROM:   SHOULDER ROM: Full bilaterally   SPURLING'S TEST: Negative  BAKODY'S SIGN:  No sig. pain with overhead activity    DATA REVIEW:   The below imaging studies were personally reviewed and discussed with the patient.    Medical Decision Making:  The above note constitutes a Moderate to High level of medical decision making based on past data and imaging review, new and chronic symptoms with exacerbation, change in weakness or sensation, new imaging and diagnostic studies ordered, discussion of potential interventional or surgical treatment options, acute or chronic pain that may pose a threat to bodily function.    Medical History[1]    Medication Documentation Review Audit       Reviewed by Candice Johnston MD (Physician) on 05/28/25 at 1344      Medication Order Taking? Sig Documenting Provider Last Dose Status   calcium carbonate (CALCIUM 500 ORAL) [id number] Yes Take by mouth once daily. Historical Provider, MD  Active   cholecalciferol, vitamin D3, (VITAMIN D3 ORAL) [id number] Yes Take by mouth. Historical Provider, MD  Active   cyclobenzaprine (Flexeril) 5 mg tablet [id number] Yes Take 1 tablet (5 mg) by mouth as needed at bedtime for muscle spasms. Candice Johnston MD  Active   fish oil concentrate (Omega-3) 120-180 mg capsule [id number]  Take 1 capsule (1 g) by mouth if needed.   Patient not taking: Reported on 5/28/2025    Historical Provider, MD  Active   levothyroxine (Synthroid, Levoxyl) 100 mcg tablet [id number] Yes Take 1 tablet (100 mcg) by mouth once daily. Candice Johnston MD  Active    Discontinued 05/28/25 1340   lisinopril 20 mg tablet [id number]  Take 1 tablet (20 mg) by mouth once daily. Candice Johnston MD  Active   meclizine (Antivert) 25 mg tablet [id number] Yes Take 1 tablet (25 mg) by mouth 3 times a day as needed for  dizziness. Historical Provider, MD  Active   meloxicam (Mobic) 15 mg tablet [id number] Yes Take 1 tablet (15 mg) by mouth once daily. Candice Johnston MD  Active                    RX Allergies[2]    Social History     Socioeconomic History    Marital status:      Spouse name: Not on file    Number of children: Not on file    Years of education: Not on file    Highest education level: Not on file   Occupational History    Not on file   Tobacco Use    Smoking status: Never    Smokeless tobacco: Never   Vaping Use    Vaping status: Never Used   Substance and Sexual Activity    Alcohol use: Yes     Comment: rare    Drug use: Never    Sexual activity: Not on file   Other Topics Concern    Not on file   Social History Narrative    Not on file     Social Drivers of Health     Financial Resource Strain: Not on file   Food Insecurity: Not on file   Transportation Needs: Not on file   Physical Activity: Not on file   Stress: Not on file   Social Connections: Not on file   Intimate Partner Violence: Not on file   Housing Stability: Not on file       Surgical History[3]         [1]   Past Medical History:  Diagnosis Date    Acute upper respiratory infection, unspecified 11/20/2019    Acute URI    Encounter for screening mammogram for malignant neoplasm of breast     Visit for screening mammogram    Hyperlipidemia, unspecified     Dyslipidemia    Noninfective gastroenteritis and colitis, unspecified 02/25/2015    Colitis    Other conditions influencing health status     Screening for malignant neoplasms, colon    Other specified disorders of left ear 07/26/2016    Congestion of left ear    Pain in left knee 03/14/2019    Left knee pain    Pain in right knee 09/19/2017    Chronic pain of right knee    Personal history of other diseases of the circulatory system 09/30/2014    History of hypertension    Personal history of other diseases of the musculoskeletal system and connective tissue 09/30/2014    History of backache     Personal history of other diseases of the respiratory system 01/20/2015    History of upper respiratory infection    Personal history of other diseases of the respiratory system 01/19/2015    History of acute sinusitis    Personal history of other endocrine, nutritional and metabolic disease     History of hypothyroidism    Personal history of other specified conditions 02/25/2015    History of abdominal pain    Personal history of urinary (tract) infections     History of urinary tract infection    Prediabetes     Prediabetes    Strain of muscle, fascia and tendon of lower back, initial encounter 12/31/2018    Lumbar strain    Streptococcal pharyngitis 06/23/2018    Strep pharyngitis    Streptococcal pharyngitis 06/26/2018    Strep pharyngitis    Unilateral primary osteoarthritis, right knee 12/31/2018    Primary osteoarthritis of right knee   [2]   Allergies  Allergen Reactions    Penicillins Unknown    Codeine Rash and Other   [3]   Past Surgical History:  Procedure Laterality Date    COLONOSCOPY  05/19/2015    Complete Colonoscopy    OTHER SURGICAL HISTORY  01/22/2021    Colonoscopy

## 2025-07-31 ENCOUNTER — TREATMENT (OUTPATIENT)
Dept: PHYSICAL THERAPY | Facility: CLINIC | Age: 75
End: 2025-07-31
Payer: MEDICARE

## 2025-07-31 DIAGNOSIS — M54.2 NECK PAIN: Primary | ICD-10-CM

## 2025-07-31 PROCEDURE — 97140 MANUAL THERAPY 1/> REGIONS: CPT | Mod: GP | Performed by: PHYSICAL THERAPIST

## 2025-07-31 ASSESSMENT — PAIN - FUNCTIONAL ASSESSMENT: PAIN_FUNCTIONAL_ASSESSMENT: 0-10

## 2025-07-31 ASSESSMENT — PAIN SCALES - GENERAL: PAINLEVEL_OUTOF10: 5 - MODERATE PAIN

## 2025-07-31 NOTE — PROGRESS NOTES
Physical Therapy Treatment    Patient Name: Evelyne Coley  MRN: [id number]  Today's Date: 7/31/2025    Time Entry:   Time Calculation  Start Time: 1407  Stop Time: 1450  Time Calculation (min): 43 min     PT Therapeutic Procedures Time Entry  Manual Therapy Time Entry: 38                   Insurance:  Visit:  6 of JASWANT Harmon required: No  Payor: [employer] / Plan: [employer] / Product Type: *No Product type* /    Certification Dates:  5/27/25 - 8/25/25     Assessment:   Long discussion regarding treatment options.  Agreeable to hold PT until MRI/follow up with Dr. Price to determine if injection in conjunction with therapy would be a better approach.  Demonstrates understanding of HEP.      Plan:   Demonstrates ability to continue with independent home program.  Discharge to [facility].     Current Problem  1. Neck pain              General  General  Reason for Referral: Neck pain  Referred By: Dr. Johnston    Subjective    Saw Dr Price yesterday.  Rx'd new muscle relaxer and ordered MRI.  Also given referral for neuro for Cx botox injection for high tone UT.  Overall sx are about the same.    Precautions  Precautions  Medical Precautions: No known precautions/limitation (Medical hx reviewed; not likely to impact care.)  Pain  Pain Assessment  Pain Assessment: 0-10  0-10 (Numeric) Pain Score: 5 - Moderate pain  Pain Location: Neck  Pain Orientation: Right    Objective   AROM  R Cx rotation 65  L Cx rotation 60    TREATMENT  THERAPEUTIC EXERCISE:      MANUAL THERAPY:  MFR R/L UT  Cx distraction    NEUROMUSCULAR RE-EDUCATION:      THERAPEUTIC ACTIVITY:      SELF-CARE:      OP EDUCATION:   Access Code: [email or web address]  URL: [email or web address]  Date: 06/03/2025  Prepared by: Kat Rocha    Exercises  - Supine Chin Tuck  - 2 x daily - 7 x weekly - 1 sets - 5 reps - 5 hold  - Supine Shoulder Horizontal Abduction with Resistance  - 2 x daily - 7 x weekly - 1 sets - 10 reps  - Shoulder  External Rotation and Scapular Retraction with Resistance  - 2 x daily - 7 x weekly - 1 sets - 10 reps  - Standing Shoulder Row with Anchored Resistance  - 2 x daily - 7 x weekly - 1 sets - 10 reps  - Standing Cervical Rotation AROM with Overpressure  - 2 x daily - 7 x weekly - 1 sets - 5 reps - 10 hold  - Shoulder extension with resistance - Neutral  - 2 x daily - 7 x weekly - 1 sets - 10 reps    Goals:  1. Pain 0/10  2. Outcomes Measure:  NDI 2/50  3. Cx R rotation > 55 deg to allow patient to look over shoulder without increased sx.  4. Demonstrates independence with HEP.

## 2025-08-12 ENCOUNTER — APPOINTMENT (OUTPATIENT)
Dept: RHEUMATOLOGY | Facility: CLINIC | Age: 75
End: 2025-08-12
Payer: MEDICARE

## 2025-08-29 ENCOUNTER — HOSPITAL ENCOUNTER (OUTPATIENT)
Dept: RADIOLOGY | Facility: HOSPITAL | Age: 75
Discharge: HOME | End: 2025-08-29
Payer: MEDICARE

## 2025-08-29 DIAGNOSIS — M54.12 CERVICAL RADICULITIS: ICD-10-CM

## 2025-08-29 DIAGNOSIS — M54.2 CERVICALGIA: ICD-10-CM

## 2025-08-29 DIAGNOSIS — M47.812 CERVICAL SPONDYLOSIS: ICD-10-CM

## 2025-08-29 PROCEDURE — 72141 MRI NECK SPINE W/O DYE: CPT

## 2025-10-15 ENCOUNTER — APPOINTMENT (OUTPATIENT)
Dept: PRIMARY CARE | Facility: CLINIC | Age: 75
End: 2025-10-15
Payer: MEDICARE

## 2026-01-28 ENCOUNTER — APPOINTMENT (OUTPATIENT)
Dept: PRIMARY CARE | Facility: CLINIC | Age: 76
End: 2026-01-28
Payer: MEDICARE

## 2026-02-16 ENCOUNTER — APPOINTMENT (OUTPATIENT)
Dept: NEUROLOGY | Facility: CLINIC | Age: 76
End: 2026-02-16
Payer: MEDICARE

## 2026-06-03 ENCOUNTER — APPOINTMENT (OUTPATIENT)
Dept: PRIMARY CARE | Facility: CLINIC | Age: 76
End: 2026-06-03
Payer: MEDICARE